# Patient Record
Sex: FEMALE | Race: WHITE | NOT HISPANIC OR LATINO | Employment: UNEMPLOYED | URBAN - METROPOLITAN AREA
[De-identification: names, ages, dates, MRNs, and addresses within clinical notes are randomized per-mention and may not be internally consistent; named-entity substitution may affect disease eponyms.]

---

## 2018-01-17 ENCOUNTER — OFFICE VISIT (OUTPATIENT)
Dept: URGENT CARE | Facility: CLINIC | Age: 53
End: 2018-01-17
Payer: COMMERCIAL

## 2018-01-17 VITALS
WEIGHT: 204 LBS | RESPIRATION RATE: 16 BRPM | TEMPERATURE: 97 F | OXYGEN SATURATION: 97 % | HEIGHT: 65 IN | DIASTOLIC BLOOD PRESSURE: 80 MMHG | HEART RATE: 82 BPM | SYSTOLIC BLOOD PRESSURE: 110 MMHG | BODY MASS INDEX: 33.99 KG/M2

## 2018-01-17 DIAGNOSIS — S82.451A CLOSED DISPLACED COMMINUTED FRACTURE OF SHAFT OF RIGHT FIBULA, INITIAL ENCOUNTER: Primary | ICD-10-CM

## 2018-01-17 PROCEDURE — 99203 OFFICE O/P NEW LOW 30 MIN: CPT | Mod: S$GLB,,, | Performed by: FAMILY MEDICINE

## 2018-01-17 RX ORDER — FERROUS SULFATE, DRIED 160(50) MG
1 TABLET, EXTENDED RELEASE ORAL 2 TIMES DAILY WITH MEALS
COMMUNITY

## 2018-01-17 RX ORDER — ALENDRONATE SODIUM 70 MG/1
70 TABLET ORAL WEEKLY
Refills: 5 | COMMUNITY
Start: 2018-01-06

## 2018-01-17 RX ORDER — BUDESONIDE 0.5 MG/2ML
INHALANT ORAL
Refills: 3 | COMMUNITY
Start: 2018-01-03

## 2018-01-17 RX ORDER — SIMVASTATIN 40 MG/1
40 TABLET, FILM COATED ORAL DAILY
Refills: 6 | COMMUNITY
Start: 2017-12-27

## 2018-01-17 RX ORDER — MONTELUKAST SODIUM 10 MG/1
10 TABLET ORAL NIGHTLY
Refills: 4 | COMMUNITY
Start: 2017-12-27

## 2018-01-17 RX ORDER — HYDROCODONE BITARTRATE AND ACETAMINOPHEN 5; 325 MG/1; MG/1
1 TABLET ORAL EVERY 6 HOURS PRN
Qty: 10 TABLET | Refills: 0 | Status: SHIPPED | OUTPATIENT
Start: 2018-01-17

## 2018-01-17 RX ORDER — SERTRALINE HYDROCHLORIDE 50 MG/1
TABLET, FILM COATED ORAL
Refills: 1 | COMMUNITY
Start: 2017-12-21

## 2018-01-17 RX ORDER — PREGABALIN 100 MG/1
100 CAPSULE ORAL 3 TIMES DAILY
Refills: 4 | COMMUNITY
Start: 2017-12-20

## 2018-01-17 RX ORDER — LEVOTHYROXINE SODIUM 100 UG/1
100 TABLET ORAL DAILY
Refills: 6 | COMMUNITY
Start: 2017-12-27

## 2018-01-17 RX ORDER — CETIRIZINE HYDROCHLORIDE 1 MG/ML
SOLUTION ORAL DAILY
COMMUNITY

## 2018-01-17 RX ORDER — PSEUDOEPHEDRINE HCL 120 MG/1
120 TABLET, FILM COATED, EXTENDED RELEASE ORAL
COMMUNITY

## 2018-01-17 NOTE — PATIENT INSTRUCTIONS
Leg Fracture    You have a break (fracture) of the leg. A fracture is treated with a splint, cast, or special boot. It will usually take at about 8 to 12 weeks for the fracture to heal, but it can take several months in some cases. If you have a severe injury, you may need surgery to fix it.  Home care  Follow these guidelines when caring for yourself at home:  · You will be given a splint, cast, boot, or other device to keep the injured area from moving. Unless you were told otherwise, use crutches or a walker. Dont put weight on the injured leg until your healthcare provider says you can do so. (You can rent crutches and a walker at many pharmacies and surgical or orthopedic supply stores.)  · Keep your leg elevated to reduce pain and swelling. When sleeping, put a pillow under the injured leg. When sitting, support the injured leg so it is above your waist. This is very important during the first 2 days (48 hours).  · Put an ice pack on the injured area. Do this for 20 minutes every 1 to 2 hours the first day for pain relief. You can make an ice pack by wrapping a plastic bag of ice cubes in a thin towel. As the ice melts, be careful that the cast, splint, or boot doesnt get wet. You can put the ice pack directly over the splint or cast. Continue using the ice pack 3 to 4 times a day for the next 2 days. Then use the ice pack as needed to ease pain and swelling.  · Keep the cast, splint, or boot completely dry at all times. Bathe with your cast, splint, or boot out of the water. Protect it with a large plastic bag, rubber-banded at the top end. If a boot or fiberglass cast or splint gets wet, you can dry it with a hair dryer.  · You may use acetaminophen or ibuprofen to control pain, unless another pain medicine was prescribed. If you have chronic liver or kidney disease, talk with your healthcare provider before using these medicines. Also talk with your provider if youve had a stomach ulcer or  gastrointestinal bleeding.  · Dont put creams or objects under the cast if you have itching.  Follow-up care  Follow up with your healthcare provider, or as advised. This is to make sure the bone is healing the way it should. If a splint was put on, it may be converted to a cast at your next visit.  X-rays may be taken. You will be told of any new findings that may affect your care.  When to seek medical advice  Call your healthcare provider right away if any of these occur:  · The cast or splint cracks  · The plaster cast or splint becomes wet or soft  · The fiberglass cast or splint stays wet for more than 24 hours  · Bad odor from the cast or wound fluid stains the cast  · Tightness or pain under the cast or splint gets worse  · Toes become swollen, cold, blue, numb, or tingly  · You cant move your toes  · Skin around cast or splint becomes red  · Fever of 100.4ºF (38ºC) or higher, or as directed by your healthcare provider  Date Last Reviewed: 2/1/2017 © 2000-2017 [employer]. [address] [place] [id number]. All rights reserved. This information is not intended as a substitute for professional medical care. Always follow your healthcare professional's instructions.

## 2018-01-17 NOTE — PROGRESS NOTES
"Subjective:       Patient ID: Rosalia Levi is a 52 y.o. female.    Vitals:  height is 5' 5" (1.651 m) and weight is 92.5 kg (204 lb). Her temperature is 97.4 °F (36.3 °C). Her blood pressure is 110/80 and her pulse is 82. Her respiration is 16 and oxygen saturation is 97%.     Chief Complaint: Ankle Pain and Ankle Injury    Pt was walking across the street and walked over some ice. She slipped and felt her ankle twist and fell on the ground. R ankle. Pt fell in the middle of [address] and crawled to the side walk. Happened around 3 hours ago. Denies taking anything for the pain. Pt is from [place]. Pt here with her  for a conference. Pt cannot put weight on the foot. Cannot move the ankle.       Ankle Pain    The incident occurred 1 to 3 hours ago. The incident occurred in the street. The pain is present in the right ankle. The pain is at a severity of 9/10. Pertinent negatives include no numbness.   Ankle Injury    Pertinent negatives include no numbness.     Review of Systems   Constitution: Negative for weakness and malaise/fatigue.   HENT: Negative for nosebleeds.    Cardiovascular: Negative for chest pain and syncope.   Respiratory: Negative for shortness of breath.    Musculoskeletal: Negative for back pain, joint pain and neck pain.   Gastrointestinal: Negative for abdominal pain.   Genitourinary: Negative for hematuria.   Neurological: Negative for dizziness and numbness.       Objective:      Physical Exam   Constitutional: She is oriented to person, place, and time. She appears well-developed and well-nourished. She is cooperative.  Non-toxic appearance. She does not appear ill. No distress.   HENT:   Head: Normocephalic and atraumatic. Head is without abrasion, without contusion and without laceration.   Right Ear: Hearing, tympanic membrane and ear canal normal. No hemotympanum.   Left Ear: Hearing, tympanic membrane and ear canal normal. No hemotympanum.   Nose: No mucosal edema, " rhinorrhea or nasal deformity. No epistaxis. Right sinus exhibits no maxillary sinus tenderness and no frontal sinus tenderness. Left sinus exhibits no maxillary sinus tenderness and no frontal sinus tenderness.   Mouth/Throat: Uvula is midline and mucous membranes are normal. No trismus in the jaw. Normal dentition. No uvula swelling. No posterior oropharyngeal erythema.   Eyes: Lids are normal. Right eye exhibits no discharge. Left eye exhibits no discharge. No scleral icterus.   Sclera clear bilat   Neck: Trachea normal, full passive range of motion without pain and phonation normal. No spinous process tenderness and no muscular tenderness present. No neck rigidity. No tracheal deviation present.   Cardiovascular: Normal pulses.    Abdominal: Normal appearance. She exhibits no pulsatile midline mass.   Musculoskeletal: She exhibits tenderness. She exhibits no edema or deformity.        Right ankle: She exhibits decreased range of motion and swelling. She exhibits no deformity, no laceration and normal pulse. Tenderness. Lateral malleolus tenderness found.        Feet:    Neurological: She is alert and oriented to person, place, and time. She has normal strength. No cranial nerve deficit or sensory deficit. She exhibits normal muscle tone. She displays no seizure activity. Coordination normal. GCS eye subscore is 4. GCS verbal subscore is 5. GCS motor subscore is 6.   Skin: Skin is warm, dry and intact. Capillary refill takes less than 2 seconds. No abrasion, no bruising, no burn, no ecchymosis and no laceration noted. She is not diaphoretic. No pallor.   Psychiatric: She has a normal mood and affect. Her speech is normal and behavior is normal. Judgment and thought content normal. Cognition and memory are normal.   Nursing note and vitals reviewed.      Assessment:       1. Closed displaced comminuted fracture of shaft of right fibula, initial encounter        Plan:         Closed displaced comminuted fracture of  shaft of right fibula, initial encounter  -     X-Ray Ankle Complete Right  -     CRUTCHES FOR HOME USE  -     AIR CAST WALKER BOOT FOR HOME USE  -     hydrocodone-acetaminophen 5-325mg (NORCO) 5-325 mg per tablet; Take 1 tablet by mouth every 6 (six) hours as needed for Pain. The medication you have been prescribed may cause drowsiness and impair your judgement.  Therefore, you should avoid driving, climbing, using machinery, etc., so as not to increase your risk of injury.  Do NOT drink any alcohol while on this medication(s). It is also addictive.  Dispense: 10 tablet; Refill: 0

## 2018-08-01 ENCOUNTER — OFFICE VISIT (OUTPATIENT)
Dept: ENDOCRINOLOGY | Facility: HOSPITAL | Age: 53
End: 2018-08-01
Payer: COMMERCIAL

## 2018-08-01 VITALS
HEIGHT: 65 IN | HEART RATE: 92 BPM | BODY MASS INDEX: 36.22 KG/M2 | WEIGHT: 217.4 LBS | DIASTOLIC BLOOD PRESSURE: 74 MMHG | SYSTOLIC BLOOD PRESSURE: 118 MMHG

## 2018-08-01 DIAGNOSIS — E06.3 HYPOTHYROIDISM DUE TO HASHIMOTO'S THYROIDITIS: ICD-10-CM

## 2018-08-01 DIAGNOSIS — R63.5 WEIGHT GAIN: Primary | ICD-10-CM

## 2018-08-01 DIAGNOSIS — E03.8 HYPOTHYROIDISM DUE TO HASHIMOTO'S THYROIDITIS: ICD-10-CM

## 2018-08-01 DIAGNOSIS — L65.9 HAIR LOSS: ICD-10-CM

## 2018-08-01 PROBLEM — E03.9 HYPOTHYROIDISM: Status: ACTIVE | Noted: 2018-08-01

## 2018-08-01 PROCEDURE — 99203 OFFICE O/P NEW LOW 30 MIN: CPT | Performed by: INTERNAL MEDICINE

## 2018-08-01 RX ORDER — TRIAMCINOLONE ACETONIDE 55 UG/1
SPRAY, METERED NASAL
COMMUNITY

## 2018-08-01 RX ORDER — BUDESONIDE 0.5 MG/2ML
INHALANT ORAL
Refills: 3 | COMMUNITY
Start: 2018-07-10

## 2018-08-01 RX ORDER — SERTRALINE HYDROCHLORIDE 100 MG/1
100 TABLET, FILM COATED ORAL DAILY
Refills: 4 | COMMUNITY
Start: 2018-07-10

## 2018-08-01 RX ORDER — SIMVASTATIN 40 MG
40 TABLET ORAL DAILY
Refills: 3 | COMMUNITY
Start: 2018-07-24

## 2018-08-01 RX ORDER — PREGABALIN 100 MG/1
100 CAPSULE ORAL 3 TIMES DAILY
COMMUNITY
Start: 2017-12-20

## 2018-08-01 RX ORDER — PSEUDOEPHEDRINE HCL 120 MG/1
120 TABLET, FILM COATED, EXTENDED RELEASE ORAL AS NEEDED
COMMUNITY

## 2018-08-01 RX ORDER — HYDROCODONE BITARTRATE AND ACETAMINOPHEN 5; 325 MG/1; MG/1
1 TABLET ORAL EVERY 6 HOURS PRN
COMMUNITY
Start: 2018-01-17

## 2018-08-01 RX ORDER — CALCIUM CARBONATE-CHOLECALCIFEROL TAB 250 MG-125 UNIT 250-125 MG-UNIT
1 TAB ORAL
COMMUNITY

## 2018-08-01 RX ORDER — LEVOTHYROXINE SODIUM 0.1 MG/1
100 TABLET ORAL DAILY
Refills: 6 | COMMUNITY
Start: 2018-07-23 | End: 2018-08-18 | Stop reason: SDUPTHER

## 2018-08-01 RX ORDER — CETIRIZINE HYDROCHLORIDE 10 MG/1
TABLET, CHEWABLE ORAL DAILY
COMMUNITY

## 2018-08-01 RX ORDER — MONTELUKAST SODIUM 10 MG/1
10 TABLET ORAL
COMMUNITY
Start: 2017-12-27

## 2018-08-01 NOTE — PATIENT INSTRUCTIONS
Most recent thyroid blood work in May was normal   Let's check blood work now and do it fasting  Call within 1 week of the blood work for results  Follow up based on blood work

## 2018-08-01 NOTE — PROGRESS NOTES
8/1/2018    Assessment/Plan      Diagnoses and all orders for this visit:    Weight gain  -     Comprehensive metabolic panel Lab Collect; Future  -     TSH, 3rd generation Lab Collect; Future  -     T4, free Lab Collect; Future  -     Testosterone, free, total Lab Collect; Future  -     T3, free; Future  -     Insulin, fasting- Lab Collect; Future  -     Comprehensive metabolic panel Lab Collect  -     TSH, 3rd generation Lab Collect  -     T4, free Lab Collect  -     Testosterone, free, total Lab Collect  -     T3, free    Hair loss  -     Comprehensive metabolic panel Lab Collect; Future  -     TSH, 3rd generation Lab Collect; Future  -     T4, free Lab Collect; Future  -     Testosterone, free, total Lab Collect; Future  -     T3, free; Future  -     Insulin, fasting- Lab Collect; Future  -     Comprehensive metabolic panel Lab Collect  -     TSH, 3rd generation Lab Collect  -     T4, free Lab Collect  -     Testosterone, free, total Lab Collect  -     T3, free    Hypothyroidism due to Hashimoto's thyroiditis  -     Comprehensive metabolic panel Lab Collect; Future  -     TSH, 3rd generation Lab Collect; Future  -     T4, free Lab Collect; Future  -     Testosterone, free, total Lab Collect; Future  -     T3, free; Future  -     Insulin, fasting- Lab Collect; Future  -     Comprehensive metabolic panel Lab Collect  -     TSH, 3rd generation Lab Collect  -     T4, free Lab Collect  -     Testosterone, free, total Lab Collect  -     T3, free    Other orders  -     budesonide (PULMICORT) 0 5 mg/2 mL nebulizer solution; TAKE 1 VIAL (2 ML) USING NEBULIZER EVERY 12 HOURS  -     Calcium Carb-Cholecalciferol (CALCIUM-VITAMIN D3) 250-125 MG-UNIT TABS; Take 1 tablet by mouth  -     cetirizine (ZyrTEC) 10 MG chewable tablet; Chew daily  -     HYDROcodone-acetaminophen (NORCO) 5-325 mg per tablet; Take 1 tablet by mouth every 6 (six) hours as needed  -     levothyroxine 100 mcg tablet;  Take 100 mcg by mouth daily  - pregabalin (LYRICA) 100 mg capsule; Take 100 mg by mouth Three times a day  -     montelukast (SINGULAIR) 10 mg tablet; Take 10 mg by mouth  -     Multiple Vitamins-Minerals (MULTIVITAMIN ADULT EXTRA C PO); Take 1 capsule by mouth daily  -     pseudoephedrine (SUDAFED) 120 MG 12 hr tablet; Take 120 mg by mouth  -     sertraline (ZOLOFT) 50 mg tablet; TAKE 1 TABLET DAILY WITH 100MG TABLET  -     sertraline (ZOLOFT) 100 mg tablet; Take 100 mg by mouth daily  -     simvastatin (ZOCOR) 40 mg tablet; Take 40 mg by mouth daily  -     Triamcinolone Acetonide 55 MCG/ACT AERO; into each nostril        Assessment/Plan:  1  Hypothyroidism  Most recent thyroid function tests in May are normal  It has been several months since she is continuing to feel worse, so I will repeat her TSH with free T4 and free T3 now  She will continue the same levothyroxine 100 mcg per day for now  2   Given her hair loss and weight gain, I will check a fasting CMP with insulin level along with free and total testosterone levels  She has a family history of diabetes in could have some element of PCOS/metabolic syndrome  She will call week after the studies for results and we will determine follow-up based on that  CC: thyroid consult    History of Present Illness     HPI: Elvira Dwyer is a 47y o  year old female with history of hypothyroidism  She has been on thyroid hormone for several years  She takes levothyroxine 100 mcg daily  No recent dosage change  She she has no acute heat or cold intolerance but can feel hot at times and limbs can feel cold  She does get palpitations when she is more anxious and her anxiety has been a bit worse  She denies tremors  She has alternating diarrhea and constipation  She says sleeping can be variable and she is exhausted all the time  She can actually sleep up to 12 hours at a time in still be exhausted  She says she has been gaining weight at least 10 lb since the beginning of the year    She has dry skin, dry nails, and dry hair  She says she is losing more hair in its thinning visibly on the top of her head  She fractured her right lower leg in January 2018 in [place]  She had been on Fosamax on and off for many years and stopped it in January 2018  She noticed it took several months to heal and still hurts her at times  Review of Systems   Constitutional: Positive for fatigue and unexpected weight change  Went increased 15 lbs very quickly at the end of 2017 to 2018  Extremely fatigued and can sleep up to 12 hours at time  HENT: Negative for hearing loss and tinnitus  Eyes: Negative for visual disturbance  No diplopia  Wears glasses  Respiratory: Positive for shortness of breath  Negative for chest tightness  Some increase SOB with weight gain  Cardiovascular: Positive for palpitations  Negative for chest pain and leg swelling  Some palpitations with increase in anxiety  Gastrointestinal: Positive for constipation and diarrhea  Negative for abdominal pain and nausea  Endocrine: Negative for cold intolerance and heat intolerance  Can be hot but feet and arms get cold  Musculoskeletal: Positive for arthralgias and neck pain  Negative for back pain  She hurts all over in general  Wrists, hips, knees, neck pain  Skin: Negative for rash  Has dry skin, nails, and hair  Losing hair again  She feels her hair is thinning on top  Neurological: Positive for dizziness  Negative for tremors, light-headedness, numbness and headaches  Will get dizzy spells  Psychiatric/Behavioral: Positive for sleep disturbance  Negative for dysphoric mood  The patient is nervous/anxious  Sleeping is variable         Historical Information   Past Medical History:   Diagnosis Date    Anxiety     Depression     Fibromyalgia     Fracture of right lower leg 01/2018    GERD (gastroesophageal reflux disease)     Hyperlipidemia     Myofascial pain dysfunction syndrome     Osteoporosis     Seasonal and perennial allergic rhinitis      Past Surgical History:   Procedure Laterality Date    ANKLE SURGERY Right     KNEE ARTHROTOMY Left     X 3    SINUS SURGERY      X 4    WISDOM TOOTH EXTRACTION       Social History   History   Alcohol Use No     History   Drug Use No     History   Smoking Status    Never Smoker   Smokeless Tobacco    Never Used     Family History:   Family History   Problem Relation Age of Onset   Geri Silence Breast cancer Mother     Thyroid disease unspecified Mother         post partial thyroidectomy    Diabetes unspecified Father     Hypertension Father     Heart attack Father     Heart disease Father     Breast cancer Maternal Grandmother     Diabetes unspecified Maternal Grandfather     Diabetes unspecified Paternal Grandfather     Heart Valve Disease Brother         Has mitral valve disease and had endocarditis       Meds/Allergies   Current Outpatient Prescriptions   Medication Sig Dispense Refill    budesonide (PULMICORT) 0 5 mg/2 mL nebulizer solution TAKE 1 VIAL (2 ML) USING NEBULIZER EVERY 12 HOURS   3    Calcium Carb-Cholecalciferol (CALCIUM-VITAMIN D3) 250-125 MG-UNIT TABS Take 1 tablet by mouth      cetirizine (ZyrTEC) 10 MG chewable tablet Chew daily      HYDROcodone-acetaminophen (NORCO) 5-325 mg per tablet Take 1 tablet by mouth every 6 (six) hours as needed      levothyroxine 100 mcg tablet Take 100 mcg by mouth daily  6    montelukast (SINGULAIR) 10 mg tablet Take 10 mg by mouth      Multiple Vitamins-Minerals (MULTIVITAMIN ADULT EXTRA C PO) Take 1 capsule by mouth daily      pregabalin (LYRICA) 100 mg capsule Take 100 mg by mouth Three times a day      pseudoephedrine (SUDAFED) 120 MG 12 hr tablet Take 120 mg by mouth      sertraline (ZOLOFT) 100 mg tablet Take 100 mg by mouth daily  4    sertraline (ZOLOFT) 50 mg tablet TAKE 1 TABLET DAILY WITH 100MG TABLET  5    simvastatin (ZOCOR) 40 mg tablet Take 40 mg by mouth daily  3    Triamcinolone Acetonide 55 MCG/ACT AERO into each nostril       No current facility-administered medications for this visit  Allergies   Allergen Reactions    Blueberry Flavor Hives    Clindamycin Hives    Doxycycline Hives    Dust Mite Extract Sneezing    Milk Protein Hives    Orange Oil Hives    Shellfish Allergy Hives    Tomato Hives    Trichophyton Sneezing    Vancomycin      Other reaction(s): Throat Swelling     Wheat Bran Hives    Dicloxacillin Rash    Sulfa Antibiotics Rash       Objective   Vitals: Blood pressure 118/74, pulse 92, height 5' 5" (1 651 m), weight 98 6 kg (217 lb 6 4 oz)  Invasive Devices          No matching active lines, drains, or airways          Physical Exam   Constitutional: She is oriented to person, place, and time  She appears well-developed and well-nourished  HENT:   Head: Normocephalic and atraumatic  Eyes: Conjunctivae and EOM are normal  Pupils are equal, round, and reactive to light  No lid lag, stare, proptosis, or periorbital edema  Neck: Normal range of motion  Neck supple  No thyromegaly present  No thyroid nodules palpable  No bruits over the thyroid or carotids  Cardiovascular: Normal rate, regular rhythm, normal heart sounds and intact distal pulses  No murmur heard  Pulmonary/Chest: Effort normal and breath sounds normal  She has no wheezes  Abdominal: Soft  Bowel sounds are normal  There is no tenderness  Musculoskeletal: Normal range of motion  She exhibits no edema or deformity  No tremor of the outstretched hands  No spinous process tenderness  No CVA tenderness  Lymphadenopathy:     She has no cervical adenopathy  Neurological: She is alert and oriented to person, place, and time  She has normal reflexes  Skin: Skin is warm and dry  No rash noted  Vitals reviewed  The history was obtained from the review of the chart and from the patient      Lab Results:   Blood work done at [facility] on 05/08/2018 showed a nonfasting CMP with a glucose of 130 but was otherwise normal   TSH is 0 873 with a free T4 of 1 17  No results found for this or any previous visit (from the past 55769 hour(s))  No future appointments

## 2018-08-03 LAB
ALBUMIN SERPL-MCNC: 4.3 G/DL (ref 3.5–5.5)
ALBUMIN/GLOB SERPL: 1.9 {RATIO} (ref 1.2–2.2)
ALP SERPL-CCNC: 77 IU/L (ref 39–117)
ALT SERPL-CCNC: 33 IU/L (ref 0–32)
AST SERPL-CCNC: 26 IU/L (ref 0–40)
BILIRUB SERPL-MCNC: 0.6 MG/DL (ref 0–1.2)
BUN SERPL-MCNC: 16 MG/DL (ref 6–24)
BUN/CREAT SERPL: 18 (ref 9–23)
CALCIUM SERPL-MCNC: 9.5 MG/DL (ref 8.7–10.2)
CHLORIDE SERPL-SCNC: 105 MMOL/L (ref 96–106)
CO2 SERPL-SCNC: 24 MMOL/L (ref 20–29)
CREAT SERPL-MCNC: 0.91 MG/DL (ref 0.57–1)
GLOBULIN SER-MCNC: 2.3 G/DL (ref 1.5–4.5)
GLUCOSE SERPL-MCNC: 123 MG/DL (ref 65–99)
INSULIN SERPL-ACNC: 18.1 UIU/ML (ref 2.6–24.9)
POTASSIUM SERPL-SCNC: 4.5 MMOL/L (ref 3.5–5.2)
PROT SERPL-MCNC: 6.6 G/DL (ref 6–8.5)
SL AMB EGFR AFRICAN AMERICAN: 84 ML/MIN/1.73
SL AMB EGFR NON AFRICAN AMERICAN: 73 ML/MIN/1.73
SODIUM SERPL-SCNC: 143 MMOL/L (ref 134–144)
T3FREE SERPL-MCNC: 2.6 PG/ML (ref 2–4.4)
T4 FREE SERPL-MCNC: 1.14 NG/DL (ref 0.82–1.77)
TESTOST FREE SERPL-MCNC: 2.3 PG/ML (ref 0–4.2)
TESTOST SERPL-MCNC: 3 NG/DL (ref 3–41)
TSH SERPL DL<=0.005 MIU/L-ACNC: 1.18 UIU/ML (ref 0.45–4.5)

## 2018-08-18 DIAGNOSIS — E03.9 HYPOTHYROIDISM, UNSPECIFIED TYPE: Primary | ICD-10-CM

## 2018-08-20 RX ORDER — LEVOTHYROXINE SODIUM 0.1 MG/1
TABLET ORAL
Qty: 30 TABLET | Refills: 6 | Status: SHIPPED | OUTPATIENT
Start: 2018-08-20 | End: 2019-03-13 | Stop reason: SDUPTHER

## 2019-03-13 DIAGNOSIS — E03.9 HYPOTHYROIDISM, UNSPECIFIED TYPE: ICD-10-CM

## 2019-03-13 RX ORDER — LEVOTHYROXINE SODIUM 0.1 MG/1
TABLET ORAL
Qty: 30 TABLET | Refills: 6 | Status: SHIPPED | OUTPATIENT
Start: 2019-03-13 | End: 2019-10-23 | Stop reason: SDUPTHER

## 2019-10-17 DIAGNOSIS — E03.9 HYPOTHYROIDISM, UNSPECIFIED TYPE: ICD-10-CM

## 2019-10-17 RX ORDER — LEVOTHYROXINE SODIUM 0.1 MG/1
TABLET ORAL
Qty: 30 TABLET | Refills: 6 | OUTPATIENT
Start: 2019-10-17

## 2019-10-23 ENCOUNTER — TELEPHONE (OUTPATIENT)
Dept: ENDOCRINOLOGY | Facility: HOSPITAL | Age: 54
End: 2019-10-23

## 2019-10-23 DIAGNOSIS — E06.3 HYPOTHYROIDISM DUE TO HASHIMOTO'S THYROIDITIS: Primary | ICD-10-CM

## 2019-10-23 DIAGNOSIS — E03.9 HYPOTHYROIDISM, UNSPECIFIED TYPE: ICD-10-CM

## 2019-10-23 DIAGNOSIS — E03.8 HYPOTHYROIDISM DUE TO HASHIMOTO'S THYROIDITIS: Primary | ICD-10-CM

## 2019-10-23 RX ORDER — LEVOTHYROXINE SODIUM 0.1 MG/1
100 TABLET ORAL DAILY
Qty: 30 TABLET | Refills: 6 | Status: SHIPPED | OUTPATIENT
Start: 2019-10-23 | End: 2019-11-22 | Stop reason: SDUPTHER

## 2019-10-23 NOTE — TELEPHONE ENCOUNTER
Patient called  Pharmacy would not refill her levothyroxine  Patient was last seen 8-1-18, but no follow up was ever done (see note on 8-2-18 lab results)  She scheduled for 1-2-10  Could you please resubmit the levothyroxine prescription and order whatever labs you want done prior to the January appt? No need to call back unless there's a problem  Mail lab slip to patient

## 2019-11-21 LAB — HBA1C MFR BLD HPLC: 6.8 %

## 2019-11-22 ENCOUNTER — TELEPHONE (OUTPATIENT)
Dept: ENDOCRINOLOGY | Facility: HOSPITAL | Age: 54
End: 2019-11-22

## 2019-11-22 DIAGNOSIS — E03.9 HYPOTHYROIDISM, UNSPECIFIED TYPE: ICD-10-CM

## 2019-11-22 RX ORDER — LEVOTHYROXINE SODIUM 0.1 MG/1
TABLET ORAL
Qty: 30 TABLET | Refills: 6
Start: 2019-11-22 | End: 2020-01-02 | Stop reason: SDUPTHER

## 2019-11-22 NOTE — TELEPHONE ENCOUNTER
Karlene Sinha from Dr Kory Long office called  They just had thyroid blood work done & results were abnormal   They want to know what you want to do  Results are in your in bin  Need to call her back with instructions ([phone])

## 2019-11-22 NOTE — TELEPHONE ENCOUNTER
We can make those changes  We would need to contact the patient and let her know  I adjusted her medication in her chart in ordered labs for 6 weeks from now

## 2019-11-22 NOTE — TELEPHONE ENCOUNTER
Reviewed labs  This would suggest her dose is too high  Would suggest reducing her dose of levothyroxine 100 mcg tablets to 1 tablet 6 days a week but only half tablet on Sundays  We will need to repeat TSH and free T4 in about 6 weeks  Labs from [address] [place] [address] on 11/21/2019 showed a TSH of 0 195 an A1c of 6 8

## 2019-12-27 LAB
ALBUMIN SERPL-MCNC: 4.6 G/DL (ref 3.5–5.5)
ALBUMIN/GLOB SERPL: 2 {RATIO} (ref 1.2–2.2)
ALP SERPL-CCNC: 97 IU/L (ref 39–117)
ALT SERPL-CCNC: 32 IU/L (ref 0–32)
AST SERPL-CCNC: 25 IU/L (ref 0–40)
BASOPHILS # BLD AUTO: 0 X10E3/UL (ref 0–0.2)
BASOPHILS NFR BLD AUTO: 1 %
BILIRUB SERPL-MCNC: 0.8 MG/DL (ref 0–1.2)
BUN SERPL-MCNC: 19 MG/DL (ref 6–24)
BUN/CREAT SERPL: 19 (ref 9–23)
CALCIUM SERPL-MCNC: 9.9 MG/DL (ref 8.7–10.2)
CHLORIDE SERPL-SCNC: 102 MMOL/L (ref 96–106)
CO2 SERPL-SCNC: 24 MMOL/L (ref 20–29)
CREAT SERPL-MCNC: 0.98 MG/DL (ref 0.57–1)
EOSINOPHIL # BLD AUTO: 0.3 X10E3/UL (ref 0–0.4)
EOSINOPHIL NFR BLD AUTO: 4 %
ERYTHROCYTE [DISTWIDTH] IN BLOOD BY AUTOMATED COUNT: 14.1 % (ref 12.3–15.4)
GLOBULIN SER-MCNC: 2.3 G/DL (ref 1.5–4.5)
GLUCOSE SERPL-MCNC: 138 MG/DL (ref 65–99)
HCT VFR BLD AUTO: 41.8 % (ref 34–46.6)
HGB BLD-MCNC: 13.4 G/DL (ref 11.1–15.9)
IMM GRANULOCYTES # BLD: 0 X10E3/UL (ref 0–0.1)
IMM GRANULOCYTES NFR BLD: 0 %
LYMPHOCYTES # BLD AUTO: 2.9 X10E3/UL (ref 0.7–3.1)
LYMPHOCYTES NFR BLD AUTO: 44 %
MCH RBC QN AUTO: 30.8 PG (ref 26.6–33)
MCHC RBC AUTO-ENTMCNC: 32.1 G/DL (ref 31.5–35.7)
MCV RBC AUTO: 96 FL (ref 79–97)
MONOCYTES # BLD AUTO: 0.4 X10E3/UL (ref 0.1–0.9)
MONOCYTES NFR BLD AUTO: 7 %
NEUTROPHILS # BLD AUTO: 2.8 X10E3/UL (ref 1.4–7)
NEUTROPHILS NFR BLD AUTO: 44 %
PLATELET # BLD AUTO: 302 X10E3/UL (ref 150–450)
POTASSIUM SERPL-SCNC: 4.5 MMOL/L (ref 3.5–5.2)
PROT SERPL-MCNC: 6.9 G/DL (ref 6–8.5)
RBC # BLD AUTO: 4.35 X10E6/UL (ref 3.77–5.28)
SL AMB EGFR AFRICAN AMERICAN: 76 ML/MIN/1.73
SL AMB EGFR NON AFRICAN AMERICAN: 66 ML/MIN/1.73
SODIUM SERPL-SCNC: 143 MMOL/L (ref 134–144)
T4 FREE SERPL-MCNC: 1.24 NG/DL (ref 0.82–1.77)
TSH SERPL DL<=0.005 MIU/L-ACNC: 4.7 UIU/ML (ref 0.45–4.5)
WBC # BLD AUTO: 6.5 X10E3/UL (ref 3.4–10.8)

## 2020-01-02 ENCOUNTER — OFFICE VISIT (OUTPATIENT)
Dept: ENDOCRINOLOGY | Facility: HOSPITAL | Age: 55
End: 2020-01-02
Payer: COMMERCIAL

## 2020-01-02 VITALS
WEIGHT: 211.6 LBS | BODY MASS INDEX: 35.25 KG/M2 | HEART RATE: 88 BPM | DIASTOLIC BLOOD PRESSURE: 74 MMHG | HEIGHT: 65 IN | SYSTOLIC BLOOD PRESSURE: 112 MMHG

## 2020-01-02 DIAGNOSIS — L65.9 HAIR LOSS: ICD-10-CM

## 2020-01-02 DIAGNOSIS — E06.3 HYPOTHYROIDISM DUE TO HASHIMOTO'S THYROIDITIS: Primary | ICD-10-CM

## 2020-01-02 DIAGNOSIS — E03.9 HYPOTHYROIDISM, UNSPECIFIED TYPE: ICD-10-CM

## 2020-01-02 DIAGNOSIS — E03.8 HYPOTHYROIDISM DUE TO HASHIMOTO'S THYROIDITIS: Primary | ICD-10-CM

## 2020-01-02 DIAGNOSIS — R73.9 HYPERGLYCEMIA: ICD-10-CM

## 2020-01-02 PROBLEM — R63.5 WEIGHT GAIN: Status: RESOLVED | Noted: 2018-08-01 | Resolved: 2020-01-02

## 2020-01-02 PROCEDURE — 99214 OFFICE O/P EST MOD 30 MIN: CPT | Performed by: INTERNAL MEDICINE

## 2020-01-02 RX ORDER — LEVOTHYROXINE SODIUM 0.1 MG/1
TABLET ORAL
Qty: 30 TABLET | Refills: 6
Start: 2020-01-02 | End: 2020-05-18

## 2020-01-02 NOTE — PATIENT INSTRUCTIONS
The thyroid blood work is underactive now  Increase the levothyroxine 100 mcg 1 whole tablet daily  We'll recheck the blood work in 3 months  Follow up in 1 year with blood work

## 2020-01-02 NOTE — PROGRESS NOTES
1/3/2020    Assessment/Plan      Diagnoses and all orders for this visit:    Hypothyroidism due to Hashimoto's thyroiditis  -     Cancel: TSH, 3rd generation Lab Collect; Future  -     Cancel: T4, free Lab Collect; Future  -     Cancel: TSH, 3rd generation Lab Collect  -     Cancel: T4, free Lab Collect  -     Comprehensive metabolic panel Lab Collect; Future  -     CBC and differential Lab Collect; Future  -     Testosterone, free, total Lab Collect; Future  -     TSH, 3rd generation Lab Collect; Future  -     T4, free Lab Collect; Future  -     HEMOGLOBIN A1C W/ EAG ESTIMATION Lab Collect; Future  -     Comprehensive metabolic panel Lab Collect  -     CBC and differential Lab Collect  -     Testosterone, free, total Lab Collect  -     TSH, 3rd generation Lab Collect  -     T4, free Lab Collect  -     HEMOGLOBIN A1C W/ EAG ESTIMATION Lab Collect    Hair loss  -     Cancel: TSH, 3rd generation Lab Collect; Future  -     Cancel: T4, free Lab Collect; Future  -     Cancel: TSH, 3rd generation Lab Collect  -     Cancel: T4, free Lab Collect  -     Comprehensive metabolic panel Lab Collect; Future  -     CBC and differential Lab Collect; Future  -     Testosterone, free, total Lab Collect; Future  -     TSH, 3rd generation Lab Collect; Future  -     T4, free Lab Collect; Future  -     HEMOGLOBIN A1C W/ EAG ESTIMATION Lab Collect; Future  -     Comprehensive metabolic panel Lab Collect  -     CBC and differential Lab Collect  -     Testosterone, free, total Lab Collect  -     TSH, 3rd generation Lab Collect  -     T4, free Lab Collect  -     HEMOGLOBIN A1C W/ EAG ESTIMATION Lab Collect    Hyperglycemia  -     Comprehensive metabolic panel Lab Collect; Future  -     CBC and differential Lab Collect; Future  -     Testosterone, free, total Lab Collect; Future  -     TSH, 3rd generation Lab Collect; Future  -     T4, free Lab Collect; Future  -     HEMOGLOBIN A1C W/ EAG ESTIMATION Lab Collect;  Future  -     Comprehensive metabolic panel Lab Collect  -     CBC and differential Lab Collect  -     Testosterone, free, total Lab Collect  -     TSH, 3rd generation Lab Collect  -     T4, free Lab Collect  -     HEMOGLOBIN A1C W/ EAG ESTIMATION Lab Collect    Hypothyroidism, unspecified type  -     levothyroxine 100 mcg tablet; 1 tab daily        Assessment/Plan:  1  Hypothyroidism due to Hashimoto's thyroiditis  Most recent thyroid function tests do show an elevation in TSH  This signifies biochemical hypothyroidism and under replacement with thyroid hormone  I have asked her to increase her levothyroxine to 100 mcg daily  I will then repeat a TSH and free T4 in 3 months  Further adjustments in her thyroid hormone will be based according to this blood work  2  Hyperglycemia  She had a recent hemoglobin A1c of 6 9%  Glucose in December was 138 random  I will follow her glucose over time and repeat her hemoglobin A1c and fasting glucose next year  I counseled her regarding carbohydrate restriction to keep the blood sugar under control  3  Hair loss  Blood work done last year for her hair loss showed no abnormalities that would have caused this  I will follow her CBC and testosterone levels over time  Thyroid function tests are a bit hypothyroid and may be contributing some to her hair loss at this point  CC:  Hypothyroid follow-up    History of Present Illness     HPI: Eb Spence is a 49y o  year old female with history of hypothyroidism here for follow-up  She has been on thyroid hormone for 4-5 years and takes levothyroxine 100 mcg 1 tablet 6 days a week and 1/2 tablet on sunday  TSH was low in November 2019 and dose of levothyroxine decreased  She has been feeling more fatigued  Of note, she has been eating better as her  was diagnosed with diabetes and has lost 6 lb  She has alternating diarrhea and constipation unchanged  She has occasional hot flashes but will feel cold arms and feet    She still has dry skin, brittle nails, and hair loss  She has a lot of difficulty falling asleep and has been struggling with her anxiety and depression  She denies actual heat or cold intolerance, palpitation, or tremors  She has no diplopia  She has no compressive thyroid symptoms or difficulties with swallowing  She had weight gain and hair loss in the past and workup was negative  She had a TKR in December 2019  Had another fall sept 2019 and knee injured with bilateral ankle sprains  She had been on crutches since September  Review of Systems   Constitutional: Positive for fatigue and unexpected weight change  Weight 6 lb less than August 2018  She is eating differently as  diagnosed with diabetes  HENT: Negative for trouble swallowing  Eyes: Negative for visual disturbance  Wears glasses  No diplopia  Respiratory: Positive for shortness of breath  Negative for chest tightness  SOB with out of shape  Cardiovascular: Negative for chest pain and palpitations  Gastrointestinal: Positive for constipation and diarrhea  Negative for abdominal pain and nausea  Alternating diarrhea and constipation unchanged  Endocrine: Negative for cold intolerance and heat intolerance  Will get occasional hot flashes, but arms and feet very cold  Genitourinary:        Postmenopausal    Musculoskeletal:        Using a cane to walk  Skin: Negative for rash  Has dry skin  Has brittle nails  Has dry hair  Still losing hair a lot  Neurological: Negative for dizziness, tremors, light-headedness and headaches  Psychiatric/Behavioral: Positive for dysphoric mood and sleep disturbance  The patient is nervous/anxious  Some difficulty falling asleep  Still struggling with anxiety and depression         Historical Information   Past Medical History:   Diagnosis Date    Anxiety     Depression     Fibromyalgia     Fracture of right lower leg 01/2018    GERD (gastroesophageal reflux disease)     Hyperlipidemia     Myofascial pain dysfunction syndrome     Osteoporosis     Seasonal and perennial allergic rhinitis      Past Surgical History:   Procedure Laterality Date    ANKLE SURGERY Right     KNEE ARTHROTOMY Left     X 3    SINUS SURGERY      X 4    TOTAL KNEE ARTHROPLASTY Left 12/2019    WISDOM TOOTH EXTRACTION       Social History   Social History     Substance and Sexual Activity   Alcohol Use No     Social History     Substance and Sexual Activity   Drug Use No     Social History     Tobacco Use   Smoking Status Never Smoker   Smokeless Tobacco Never Used     Family History:   Family History   Problem Relation Age of Onset    Breast cancer Mother     Thyroid disease unspecified Mother         post partial thyroidectomy    Diabetes unspecified Father     Hypertension Father     Heart attack Father     Heart disease Father     Breast cancer Maternal Grandmother     Diabetes unspecified Maternal Grandfather     Diabetes unspecified Paternal Grandfather     Heart Valve Disease Brother         Has mitral valve disease and had endocarditis       Meds/Allergies   Current Outpatient Medications   Medication Sig Dispense Refill    budesonide (PULMICORT) 0 5 mg/2 mL nebulizer solution TAKE 1 VIAL (2 ML) USING NEBULIZER EVERY 12 HOURS   3    Calcium Carb-Cholecalciferol (CALCIUM-VITAMIN D3) 250-125 MG-UNIT TABS Take 1 tablet by mouth      cetirizine (ZyrTEC) 10 MG chewable tablet Chew daily      HYDROcodone-acetaminophen (NORCO) 5-325 mg per tablet Take 1 tablet by mouth every 6 (six) hours as needed      levothyroxine 100 mcg tablet 1 tab daily 30 tablet 6    montelukast (SINGULAIR) 10 mg tablet Take 10 mg by mouth      Multiple Vitamins-Minerals (MULTIVITAMIN ADULT EXTRA C PO) Take 1 capsule by mouth daily      pregabalin (LYRICA) 100 mg capsule Take 100 mg by mouth Three times a day      pseudoephedrine (SUDAFED) 120 MG 12 hr tablet Take 120 mg by mouth      sertraline (ZOLOFT) 100 mg tablet Take 100 mg by mouth daily  4    sertraline (ZOLOFT) 50 mg tablet TAKE 1 TABLET DAILY WITH 100MG TABLET  5    simvastatin (ZOCOR) 40 mg tablet Take 40 mg by mouth daily  3    Triamcinolone Acetonide 55 MCG/ACT AERO into each nostril       No current facility-administered medications for this visit  Allergies   Allergen Reactions    Blueberry Flavor Hives    Clindamycin Hives    Doxycycline Hives    Dust Mite Extract Sneezing    Milk Protein Hives    Orange Oil Hives    Shellfish Allergy Hives    Tomato Hives    Trichophyton Sneezing    Vancomycin      Other reaction(s): Throat Swelling     Wheat Bran Hives    Dicloxacillin Rash    Sulfa Antibiotics Rash       Objective   Vitals: Blood pressure 112/74, pulse 88, height 5' 5" (1 651 m), weight 96 kg (211 lb 9 6 oz)  Invasive Devices     None                 Physical Exam   Constitutional: She is oriented to person, place, and time  She appears well-developed and well-nourished  HENT:   Head: Normocephalic and atraumatic  Eyes: Pupils are equal, round, and reactive to light  Conjunctivae and EOM are normal    No lid lag, stare, proptosis, or periorbital edema  Neck: Normal range of motion  Neck supple  No thyromegaly present  Thyroid normal in size without palpable thyroid nodules  No bruits over the thyroid gland or carotids  Cardiovascular: Normal rate, regular rhythm and normal heart sounds  No murmur heard  Pulmonary/Chest: Effort normal and breath sounds normal  She has no wheezes  Musculoskeletal: Normal range of motion  She exhibits no edema or deformity  No tremor of the outstretched hands  Lymphadenopathy:     She has no cervical adenopathy  Neurological: She is alert and oriented to person, place, and time  She has normal reflexes  Patellar deep tendon reflex on the right normal   Left patellar reflex was not evaluated due to her recent knee surgery     Skin: Skin is warm and dry  No rash noted  Vitals reviewed  The history was obtained from the review of the chart and from the patient  Lab Results:    Blood work done on 12/26/2019 nonfasting showed a glucose of 138 but was otherwise normal     Lab Results   Component Value Date    CREATININE 0 98 12/26/2019    CREATININE 0 91 08/02/2018    BUN 19 12/26/2019    K 4 5 12/26/2019     12/26/2019    CO2 24 12/26/2019       Lab Results   Component Value Date    ALT 32 12/26/2019    AST 25 12/26/2019       Lab Results   Component Value Date    TSH 4 700 (H) 12/26/2019    FREET4 1 24 12/26/2019     Of note, hemoglobin A1c done on 11/21/2019 showed a result of 6 9% done at [facility]      Future Appointments   Date Time Provider Flynn Padron   1/4/2021  2:20 PM Ananth Cedeno MD [facility]

## 2020-05-16 DIAGNOSIS — E03.9 HYPOTHYROIDISM, UNSPECIFIED TYPE: ICD-10-CM

## 2020-05-18 RX ORDER — LEVOTHYROXINE SODIUM 0.1 MG/1
TABLET ORAL
Qty: 30 TABLET | Refills: 6 | Status: SHIPPED | OUTPATIENT
Start: 2020-05-18 | End: 2020-12-14

## 2020-06-10 LAB
T4 FREE SERPL-MCNC: 1.57 NG/DL (ref 0.82–1.77)
TSH SERPL DL<=0.005 MIU/L-ACNC: 0.1 UIU/ML (ref 0.45–4.5)

## 2020-06-12 DIAGNOSIS — E06.3 HYPOTHYROIDISM DUE TO HASHIMOTO'S THYROIDITIS: Primary | ICD-10-CM

## 2020-06-12 DIAGNOSIS — E03.8 HYPOTHYROIDISM DUE TO HASHIMOTO'S THYROIDITIS: Primary | ICD-10-CM

## 2020-06-12 RX ORDER — LEVOTHYROXINE SODIUM 88 MCG
TABLET ORAL
Qty: 4 TABLET | Refills: 5 | Status: SHIPPED | OUTPATIENT
Start: 2020-06-12 | End: 2021-01-04 | Stop reason: SDUPTHER

## 2020-06-15 DIAGNOSIS — E06.3 HYPOTHYROIDISM DUE TO HASHIMOTO'S THYROIDITIS: Primary | ICD-10-CM

## 2020-06-15 DIAGNOSIS — E03.8 HYPOTHYROIDISM DUE TO HASHIMOTO'S THYROIDITIS: Primary | ICD-10-CM

## 2020-09-24 LAB
T4 FREE SERPL-MCNC: 1.44 NG/DL (ref 0.82–1.77)
TSH SERPL DL<=0.005 MIU/L-ACNC: 0.15 UIU/ML (ref 0.45–4.5)

## 2020-11-29 DIAGNOSIS — E03.8 HYPOTHYROIDISM DUE TO HASHIMOTO'S THYROIDITIS: ICD-10-CM

## 2020-11-29 DIAGNOSIS — E06.3 HYPOTHYROIDISM DUE TO HASHIMOTO'S THYROIDITIS: ICD-10-CM

## 2020-11-30 RX ORDER — LEVOTHYROXINE SODIUM 88 MCG
TABLET ORAL
Qty: 4 TABLET | Refills: 5 | OUTPATIENT
Start: 2020-11-30

## 2020-12-12 DIAGNOSIS — E03.9 HYPOTHYROIDISM, UNSPECIFIED TYPE: ICD-10-CM

## 2020-12-14 RX ORDER — LEVOTHYROXINE SODIUM 0.1 MG/1
TABLET ORAL
Qty: 30 TABLET | Refills: 6 | Status: SHIPPED | OUTPATIENT
Start: 2020-12-14 | End: 2021-06-29

## 2020-12-16 DIAGNOSIS — E06.3 HYPOTHYROIDISM DUE TO HASHIMOTO'S THYROIDITIS: ICD-10-CM

## 2020-12-16 DIAGNOSIS — E03.8 HYPOTHYROIDISM DUE TO HASHIMOTO'S THYROIDITIS: ICD-10-CM

## 2020-12-16 RX ORDER — LEVOTHYROXINE SODIUM 88 MCG
TABLET ORAL
Qty: 4 TABLET | Refills: 5 | OUTPATIENT
Start: 2020-12-16

## 2020-12-20 DIAGNOSIS — E03.8 HYPOTHYROIDISM DUE TO HASHIMOTO'S THYROIDITIS: ICD-10-CM

## 2020-12-20 DIAGNOSIS — E06.3 HYPOTHYROIDISM DUE TO HASHIMOTO'S THYROIDITIS: ICD-10-CM

## 2020-12-20 RX ORDER — LEVOTHYROXINE SODIUM 88 MCG
TABLET ORAL
Qty: 4 TABLET | Refills: 5 | OUTPATIENT
Start: 2020-12-20

## 2020-12-30 LAB
ALBUMIN SERPL-MCNC: 4.4 G/DL (ref 3.8–4.9)
ALBUMIN/GLOB SERPL: 1.8 {RATIO} (ref 1.2–2.2)
ALP SERPL-CCNC: 77 IU/L (ref 39–117)
ALT SERPL-CCNC: 27 IU/L (ref 0–32)
AST SERPL-CCNC: 25 IU/L (ref 0–40)
BASOPHILS # BLD AUTO: 0.1 X10E3/UL (ref 0–0.2)
BASOPHILS NFR BLD AUTO: 1 %
BILIRUB SERPL-MCNC: 1 MG/DL (ref 0–1.2)
BUN SERPL-MCNC: 16 MG/DL (ref 6–24)
BUN/CREAT SERPL: 20 (ref 9–23)
CALCIUM SERPL-MCNC: 9.7 MG/DL (ref 8.7–10.2)
CHLORIDE SERPL-SCNC: 105 MMOL/L (ref 96–106)
CO2 SERPL-SCNC: 23 MMOL/L (ref 20–29)
CREAT SERPL-MCNC: 0.8 MG/DL (ref 0.57–1)
EOSINOPHIL # BLD AUTO: 0.1 X10E3/UL (ref 0–0.4)
EOSINOPHIL NFR BLD AUTO: 2 %
ERYTHROCYTE [DISTWIDTH] IN BLOOD BY AUTOMATED COUNT: 12.9 % (ref 11.7–15.4)
EST. AVERAGE GLUCOSE BLD GHB EST-MCNC: 148 MG/DL
GLOBULIN SER-MCNC: 2.5 G/DL (ref 1.5–4.5)
GLUCOSE SERPL-MCNC: 108 MG/DL (ref 65–99)
HBA1C MFR BLD: 6.8 % (ref 4.8–5.6)
HCT VFR BLD AUTO: 44 % (ref 34–46.6)
HGB BLD-MCNC: 14.4 G/DL (ref 11.1–15.9)
IMM GRANULOCYTES # BLD: 0 X10E3/UL (ref 0–0.1)
IMM GRANULOCYTES NFR BLD: 0 %
LYMPHOCYTES # BLD AUTO: 2.4 X10E3/UL (ref 0.7–3.1)
LYMPHOCYTES NFR BLD AUTO: 31 %
MCH RBC QN AUTO: 30.3 PG (ref 26.6–33)
MCHC RBC AUTO-ENTMCNC: 32.7 G/DL (ref 31.5–35.7)
MCV RBC AUTO: 92 FL (ref 79–97)
MONOCYTES # BLD AUTO: 0.6 X10E3/UL (ref 0.1–0.9)
MONOCYTES NFR BLD AUTO: 8 %
NEUTROPHILS # BLD AUTO: 4.4 X10E3/UL (ref 1.4–7)
NEUTROPHILS NFR BLD AUTO: 58 %
PLATELET # BLD AUTO: 217 X10E3/UL (ref 150–450)
POTASSIUM SERPL-SCNC: 4.4 MMOL/L (ref 3.5–5.2)
PROT SERPL-MCNC: 6.9 G/DL (ref 6–8.5)
RBC # BLD AUTO: 4.76 X10E6/UL (ref 3.77–5.28)
SL AMB EGFR AFRICAN AMERICAN: 97 ML/MIN/1.73
SL AMB EGFR NON AFRICAN AMERICAN: 84 ML/MIN/1.73
SODIUM SERPL-SCNC: 144 MMOL/L (ref 134–144)
T4 FREE SERPL-MCNC: 1.3 NG/DL (ref 0.82–1.77)
TESTOST FREE SERPL-MCNC: 2.5 PG/ML (ref 0–4.2)
TESTOST SERPL-MCNC: 6 NG/DL (ref 3–41)
TSH SERPL DL<=0.005 MIU/L-ACNC: 0.29 UIU/ML (ref 0.45–4.5)
WBC # BLD AUTO: 7.6 X10E3/UL (ref 3.4–10.8)

## 2021-01-04 ENCOUNTER — OFFICE VISIT (OUTPATIENT)
Dept: ENDOCRINOLOGY | Facility: HOSPITAL | Age: 56
End: 2021-01-04
Payer: COMMERCIAL

## 2021-01-04 VITALS
SYSTOLIC BLOOD PRESSURE: 122 MMHG | HEART RATE: 90 BPM | WEIGHT: 213 LBS | BODY MASS INDEX: 35.49 KG/M2 | HEIGHT: 65 IN | DIASTOLIC BLOOD PRESSURE: 74 MMHG

## 2021-01-04 DIAGNOSIS — R73.9 HYPERGLYCEMIA: ICD-10-CM

## 2021-01-04 DIAGNOSIS — E06.3 HYPOTHYROIDISM DUE TO HASHIMOTO'S THYROIDITIS: Primary | ICD-10-CM

## 2021-01-04 DIAGNOSIS — E03.8 HYPOTHYROIDISM DUE TO HASHIMOTO'S THYROIDITIS: Primary | ICD-10-CM

## 2021-01-04 PROCEDURE — 99214 OFFICE O/P EST MOD 30 MIN: CPT | Performed by: INTERNAL MEDICINE

## 2021-01-04 RX ORDER — LEVOTHYROXINE SODIUM 88 MCG
TABLET ORAL
Qty: 4 TABLET | Refills: 5 | Status: SHIPPED | OUTPATIENT
Start: 2021-01-04 | End: 2021-06-14

## 2021-01-04 NOTE — PATIENT INSTRUCTIONS
The blood work for the thyroid is still a bit on the overactive side but not as bad as June 2020    for now no change in levothyroxine and synthroid doses  the blood sugars are still in the diabetes range, diet control and getting hopefully back to weight loss is the best    Try to get any exercise or PT in as able  Follow up in 1 year with blood work

## 2021-01-04 NOTE — PROGRESS NOTES
1/5/2021    Assessment/Plan      Diagnoses and all orders for this visit:    Hypothyroidism due to Hashimoto's thyroiditis  -     HEMOGLOBIN A1C W/ EAG ESTIMATION Lab Collect; Future  -     Comprehensive metabolic panel Lab Collect; Future  -     TSH, 3rd generation Lab Collect; Future  -     T4, free Lab Collect; Future  -     HEMOGLOBIN A1C W/ EAG ESTIMATION Lab Collect  -     Comprehensive metabolic panel Lab Collect  -     TSH, 3rd generation Lab Collect  -     T4, free Lab Collect  -     Synthroid 88 MCG tablet; Take 1 tablet on Sunday only    Hyperglycemia  -     HEMOGLOBIN A1C W/ EAG ESTIMATION Lab Collect; Future  -     Comprehensive metabolic panel Lab Collect; Future  -     TSH, 3rd generation Lab Collect; Future  -     T4, free Lab Collect; Future  -     HEMOGLOBIN A1C W/ EAG ESTIMATION Lab Collect  -     Comprehensive metabolic panel Lab Collect  -     TSH, 3rd generation Lab Collect  -     T4, free Lab Collect        Assessment/Plan:  1  Hypothyroidism due to Hashimoto's thyroiditis  Most recent thyroid function tests are still a bit on the overactive side  This is improved from June 2020 with change in thyroid hormone dosage  For now, continue the same thyroid hormone dosage, we'll give it a bit more time for the blood work to improve  2  Hyperglycemia  Her most recent hemoglobin A1c is 6 9% which is in the diabetes range  She needs to work on diet control and exercise again to try to get her weight down and improve her sugars  She does not need any treatment as of yet  Diet can be the treatment for now  I have asked her to follow up in 1 year with preceding hemoglobin A1c, CMP, TSH, and free T4       CC:  Hypothyroid and blood sugar follow-up    History of Present Illness     HPI: Kody Meek is a 48y o  year old female with history of hypothyroidism due to Hashimoto's thyroiditis with a history of hyperglycemia for follow-up visit  She was started on thyroid hormone about 5-6 years ago  She takes levothyroxine 100 mcg 6 days a week and synthroid brand 88 mcg on sundays  She denies heat or cold intolerance but can have very cold feet at night  She denies tremors or palpitation  She has alternating diarrhea and constipation  She has anxiety and depression unchanged  Sleeping is variable and she does not always feels if she sleeps well and is thus fatigued  She has dry skin, brittle nails, and hair loss  Weight is stable  She has no compressive thyroid symptoms or difficulties with swallowing  She had mild hyperglycemia last year with a random blood sugar 138 and hemoglobin A1c of 6 9%  She was told to work on carbohydrate restriction and dietary changes  She denies polyuria or polyphagia but has some polydipsia  She has nocturia 3-5 times a night  She has chronic nerve pain issues from a repetitive strain injury but no extremity numbness or tingling of the feet  She has no blurry vision  Review of Systems   Constitutional: Positive for fatigue  Negative for unexpected weight change  Always fatigued and will have a hard time waking up even after 12 hours of sleep, will not feel refreshed  Had lost some weight, marques back up with no change in habits  HENT: Negative for trouble swallowing  Eyes: Negative for visual disturbance  Wears glasses  Respiratory: Positive for shortness of breath  Negative for chest tightness  SOB with activity  Cardiovascular: Negative for chest pain and palpitations  Gastrointestinal: Positive for constipation and diarrhea  Negative for abdominal pain and nausea  Alternates between diarrhea and constipation  having daily heartburn for the last several months with night time acid reflux  Endocrine: Positive for polydipsia  Negative for cold intolerance, heat intolerance, polyphagia and polyuria  Feet get cold especially at night  wears socks to bed or will stay awake  Arms get cold   Still some occasional hot flashes  Nocturia 3-5 times a night  Gets dry mouth with medications so will drink more  Musculoskeletal: Positive for arthralgias  Left hip and buttocks pain  Skin: Negative for rash  Has dry skin and hair  Has hair loss  Has brittle nails  Neurological: Positive for dizziness and numbness  Negative for tremors, light-headedness and headaches  Has chronic nerve pain issues from repetitive strain injury  Will get dizzy spells  Psychiatric/Behavioral: Positive for dysphoric mood and sleep disturbance  The patient is nervous/anxious  Sleeping variable, not always feel she sleeps well, will use melatonin at night  Has unchanged anxiety and depression  Has a high anxiety day today         Historical Information   Past Medical History:   Diagnosis Date    Anxiety     Depression     Fibromyalgia     Fracture of right lower leg 01/2018    GERD (gastroesophageal reflux disease)     Hyperlipidemia     Myofascial pain dysfunction syndrome     Osteoporosis     Seasonal and perennial allergic rhinitis      Past Surgical History:   Procedure Laterality Date    ANKLE SURGERY Right     KNEE ARTHROTOMY Left     X 3    SINUS SURGERY      X 4    TOTAL KNEE ARTHROPLASTY Left 12/2019    WISDOM TOOTH EXTRACTION       Social History   Social History     Substance and Sexual Activity   Alcohol Use No     Social History     Substance and Sexual Activity   Drug Use No     Social History     Tobacco Use   Smoking Status Never Smoker   Smokeless Tobacco Never Used     Family History:   Family History   Problem Relation Age of Onset    Breast cancer Mother     Thyroid disease unspecified Mother         post partial thyroidectomy    Diabetes unspecified Father     Hypertension Father     Heart attack Father     Heart disease Father     Breast cancer Maternal Grandmother     Diabetes unspecified Maternal Grandfather     Diabetes unspecified Paternal Grandfather     Heart Valve Disease Brother         Has mitral valve disease and had endocarditis       Meds/Allergies   Current Outpatient Medications   Medication Sig Dispense Refill    budesonide (PULMICORT) 0 5 mg/2 mL nebulizer solution TAKE 1 VIAL (2 ML) USING NEBULIZER EVERY 12 HOURS   3    Calcium Carb-Cholecalciferol (CALCIUM-VITAMIN D3) 250-125 MG-UNIT TABS Take 1 tablet by mouth      cetirizine (ZyrTEC) 10 MG chewable tablet Chew daily      HYDROcodone-acetaminophen (NORCO) 5-325 mg per tablet Take 1 tablet by mouth every 6 (six) hours as needed      levothyroxine 100 mcg tablet TAKE 1 TABLET BY MOUTH EVERY DAY (Patient taking differently: TAKE 1 TABLET BY MOUTH 6 days a week) 30 tablet 6    montelukast (SINGULAIR) 10 mg tablet Take 10 mg by mouth      Multiple Vitamins-Minerals (MULTIVITAMIN ADULT EXTRA C PO) Take 1 capsule by mouth daily      pregabalin (LYRICA) 100 mg capsule Take 100 mg by mouth Three times a day      pseudoephedrine (SUDAFED) 120 MG 12 hr tablet Take 120 mg by mouth      sertraline (ZOLOFT) 100 mg tablet Take 100 mg by mouth daily  4    sertraline (ZOLOFT) 50 mg tablet TAKE 1 TABLET DAILY WITH 100MG TABLET  5    simvastatin (ZOCOR) 40 mg tablet Take 40 mg by mouth daily  3    Synthroid 88 MCG tablet Take 1 tablet on Sunday only 4 tablet 5    Triamcinolone Acetonide 55 MCG/ACT AERO into each nostril       No current facility-administered medications for this visit  Allergies   Allergen Reactions    Blueberry Flavor Hives    Clindamycin Hives    Doxycycline Hives    Dust Mite Extract Sneezing    Milk Protein Hives    Orange Oil Hives    Shellfish Allergy Hives    Tomato Hives    Trichophyton Sneezing    Vancomycin      Other reaction(s): Throat Swelling     Wheat Bran Hives    Dicloxacillin Rash    Sulfa Antibiotics Rash       Objective   Vitals: Blood pressure 122/74, pulse 90, height 5' 5" (1 651 m), weight 96 6 kg (213 lb)    Invasive Devices     None                 Physical Exam  Vitals signs reviewed  Constitutional:       Appearance: Normal appearance  She is well-developed  HENT:      Head: Normocephalic and atraumatic  Eyes:      Extraocular Movements: Extraocular movements intact  Conjunctiva/sclera: Conjunctivae normal       Comments: No lid lag, stare, proptosis, or periorbital edema  Neck:      Musculoskeletal: Normal range of motion and neck supple  Thyroid: No thyromegaly  Vascular: No carotid bruit  Comments: Thyroid normal in size  No palpable thyroid nodules  Cardiovascular:      Rate and Rhythm: Normal rate and regular rhythm  Heart sounds: Normal heart sounds  No murmur  Pulmonary:      Effort: Pulmonary effort is normal       Breath sounds: Normal breath sounds  No wheezing  Abdominal:      Palpations: Abdomen is soft  Musculoskeletal: Normal range of motion  General: No deformity  Right lower leg: No edema  Left lower leg: No edema  Comments: No tremor of the outstretched hands  Lymphadenopathy:      Cervical: No cervical adenopathy  Skin:     General: Skin is warm and dry  Findings: No rash  Neurological:      Mental Status: She is alert and oriented to person, place, and time  Deep Tendon Reflexes: Reflexes are normal and symmetric  Comments: Click of left knee when reflex done  Deep tendon reflexes without briskness  The history was obtained from the review of the chart and from the patient  Lab Results:    Blood work done on 12/29/2020 showed hemoglobin A1c of 6 9%      CMP showed a glucose of 108 random but was otherwise normal     Lab Results   Component Value Date    CREATININE 0 80 12/29/2020    CREATININE 0 98 12/26/2019    CREATININE 0 91 08/02/2018    BUN 16 12/29/2020    K 4 4 12/29/2020     12/29/2020    CO2 23 12/29/2020     Lab Results   Component Value Date    ALT 27 12/29/2020    AST 25 12/29/2020     Lab Results   Component Value Date    TSH 0 289 (L) 12/29/2020    FREET4 1 30 12/29/2020     CBC is normal     Total testosterone 6 with a free testosterone of 2 5      Future Appointments   Date Time Provider Flynn Padron   1/5/2022  3:00 PM Roberto Fields MD [facility]

## 2021-03-10 DIAGNOSIS — Z23 ENCOUNTER FOR IMMUNIZATION: ICD-10-CM

## 2021-03-29 ENCOUNTER — IMMUNIZATIONS (OUTPATIENT)
Dept: FAMILY MEDICINE CLINIC | Facility: HOSPITAL | Age: 56
End: 2021-03-29

## 2021-03-29 DIAGNOSIS — Z23 ENCOUNTER FOR IMMUNIZATION: Primary | ICD-10-CM

## 2021-03-29 PROCEDURE — 0001A SARS-COV-2 / COVID-19 MRNA VACCINE (PFIZER-BIONTECH) 30 MCG: CPT

## 2021-03-29 PROCEDURE — 91300 SARS-COV-2 / COVID-19 MRNA VACCINE (PFIZER-BIONTECH) 30 MCG: CPT

## 2021-04-23 ENCOUNTER — IMMUNIZATIONS (OUTPATIENT)
Dept: FAMILY MEDICINE CLINIC | Facility: HOSPITAL | Age: 56
End: 2021-04-23

## 2021-04-23 DIAGNOSIS — Z23 ENCOUNTER FOR IMMUNIZATION: Primary | ICD-10-CM

## 2021-04-23 PROCEDURE — 0002A SARS-COV-2 / COVID-19 MRNA VACCINE (PFIZER-BIONTECH) 30 MCG: CPT

## 2021-04-23 PROCEDURE — 91300 SARS-COV-2 / COVID-19 MRNA VACCINE (PFIZER-BIONTECH) 30 MCG: CPT

## 2021-06-12 DIAGNOSIS — E03.8 HYPOTHYROIDISM DUE TO HASHIMOTO'S THYROIDITIS: ICD-10-CM

## 2021-06-12 DIAGNOSIS — E06.3 HYPOTHYROIDISM DUE TO HASHIMOTO'S THYROIDITIS: ICD-10-CM

## 2021-06-14 RX ORDER — LEVOTHYROXINE SODIUM 88 MCG
TABLET ORAL
Qty: 4 TABLET | Refills: 5 | Status: SHIPPED | OUTPATIENT
Start: 2021-06-14 | End: 2021-07-20 | Stop reason: SDUPTHER

## 2021-07-20 ENCOUNTER — TELEPHONE (OUTPATIENT)
Dept: ENDOCRINOLOGY | Facility: HOSPITAL | Age: 56
End: 2021-07-20

## 2021-07-20 DIAGNOSIS — E06.3 HYPOTHYROIDISM DUE TO HASHIMOTO'S THYROIDITIS: Primary | ICD-10-CM

## 2021-07-20 DIAGNOSIS — E03.8 HYPOTHYROIDISM DUE TO HASHIMOTO'S THYROIDITIS: Primary | ICD-10-CM

## 2021-07-20 DIAGNOSIS — E03.9 HYPOTHYROIDISM, UNSPECIFIED TYPE: ICD-10-CM

## 2021-07-20 PROBLEM — M85.852 OSTEOPENIA OF LEFT HIP: Status: ACTIVE | Noted: 2021-07-20

## 2021-07-20 RX ORDER — LEVOTHYROXINE SODIUM 0.1 MG/1
TABLET ORAL
Qty: 30 TABLET | Refills: 6
Start: 2021-07-20 | End: 2022-02-28

## 2021-07-20 RX ORDER — LEVOTHYROXINE SODIUM 88 MCG
TABLET ORAL
Qty: 12 TABLET | Refills: 5
Start: 2021-07-20 | End: 2021-07-21 | Stop reason: SDUPTHER

## 2021-07-21 DIAGNOSIS — E03.8 HYPOTHYROIDISM DUE TO HASHIMOTO'S THYROIDITIS: ICD-10-CM

## 2021-07-21 DIAGNOSIS — E06.3 HYPOTHYROIDISM DUE TO HASHIMOTO'S THYROIDITIS: ICD-10-CM

## 2021-07-21 RX ORDER — LEVOTHYROXINE SODIUM 88 MCG
TABLET ORAL
Qty: 12 TABLET | Refills: 5 | Status: SHIPPED | OUTPATIENT
Start: 2021-07-21 | End: 2022-01-03

## 2022-01-03 DIAGNOSIS — E03.8 HYPOTHYROIDISM DUE TO HASHIMOTO'S THYROIDITIS: ICD-10-CM

## 2022-01-03 DIAGNOSIS — E06.3 HYPOTHYROIDISM DUE TO HASHIMOTO'S THYROIDITIS: ICD-10-CM

## 2022-01-03 RX ORDER — LEVOTHYROXINE SODIUM 88 MCG
TABLET ORAL
Qty: 12 TABLET | Refills: 5 | Status: SHIPPED | OUTPATIENT
Start: 2022-01-03 | End: 2022-06-10

## 2022-01-04 LAB
ALBUMIN SERPL-MCNC: 4.8 G/DL (ref 3.8–4.9)
ALBUMIN/GLOB SERPL: 2.3 {RATIO} (ref 1.2–2.2)
ALP SERPL-CCNC: 93 IU/L (ref 44–121)
ALT SERPL-CCNC: 40 IU/L (ref 0–32)
AST SERPL-CCNC: 27 IU/L (ref 0–40)
BILIRUB SERPL-MCNC: 0.7 MG/DL (ref 0–1.2)
BUN SERPL-MCNC: 18 MG/DL (ref 6–24)
BUN/CREAT SERPL: 20 (ref 9–23)
CALCIUM SERPL-MCNC: 9.5 MG/DL (ref 8.7–10.2)
CHLORIDE SERPL-SCNC: 105 MMOL/L (ref 96–106)
CO2 SERPL-SCNC: 25 MMOL/L (ref 20–29)
CREAT SERPL-MCNC: 0.9 MG/DL (ref 0.57–1)
EST. AVERAGE GLUCOSE BLD GHB EST-MCNC: 163 MG/DL
GLOBULIN SER-MCNC: 2.1 G/DL (ref 1.5–4.5)
GLUCOSE SERPL-MCNC: 154 MG/DL (ref 65–99)
HBA1C MFR BLD: 7.3 % (ref 4.8–5.6)
POTASSIUM SERPL-SCNC: 4.3 MMOL/L (ref 3.5–5.2)
PROT SERPL-MCNC: 6.9 G/DL (ref 6–8.5)
SL AMB EGFR AFRICAN AMERICAN: 83 ML/MIN/1.73
SL AMB EGFR NON AFRICAN AMERICAN: 72 ML/MIN/1.73
SODIUM SERPL-SCNC: 144 MMOL/L (ref 134–144)
T4 FREE SERPL-MCNC: 1.27 NG/DL (ref 0.82–1.77)
TSH SERPL DL<=0.005 MIU/L-ACNC: 0.78 UIU/ML (ref 0.45–4.5)

## 2022-01-05 ENCOUNTER — OFFICE VISIT (OUTPATIENT)
Dept: ENDOCRINOLOGY | Facility: HOSPITAL | Age: 57
End: 2022-01-05
Payer: COMMERCIAL

## 2022-01-05 VITALS
WEIGHT: 219.2 LBS | HEIGHT: 65 IN | DIASTOLIC BLOOD PRESSURE: 70 MMHG | HEART RATE: 94 BPM | SYSTOLIC BLOOD PRESSURE: 118 MMHG | BODY MASS INDEX: 36.52 KG/M2

## 2022-01-05 DIAGNOSIS — R73.9 HYPERGLYCEMIA: ICD-10-CM

## 2022-01-05 DIAGNOSIS — E03.8 HYPOTHYROIDISM DUE TO HASHIMOTO'S THYROIDITIS: Primary | ICD-10-CM

## 2022-01-05 DIAGNOSIS — E11.65 TYPE 2 DIABETES MELLITUS WITH HYPERGLYCEMIA, WITHOUT LONG-TERM CURRENT USE OF INSULIN (HCC): ICD-10-CM

## 2022-01-05 DIAGNOSIS — E06.3 HYPOTHYROIDISM DUE TO HASHIMOTO'S THYROIDITIS: Primary | ICD-10-CM

## 2022-01-05 PROCEDURE — 99214 OFFICE O/P EST MOD 30 MIN: CPT | Performed by: INTERNAL MEDICINE

## 2022-01-05 NOTE — PROGRESS NOTES
1/5/2022    Assessment/Plan      Diagnoses and all orders for this visit:    Hypothyroidism due to Hashimoto's thyroiditis  -     HEMOGLOBIN A1C W/ EAG ESTIMATION Lab Collect; Future  -     Comprehensive metabolic panel Lab Collect; Future  -     TSH, 3rd generation Lab Collect; Future  -     T4, free Lab Collect; Future  -     Microalbumin / creatinine urine ratio Lab Collect; Future  -     CBC and differential Lab Collect; Future  -     HEMOGLOBIN A1C W/ EAG ESTIMATION Lab Collect  -     Comprehensive metabolic panel Lab Collect  -     TSH, 3rd generation Lab Collect  -     T4, free Lab Collect  -     Microalbumin / creatinine urine ratio Lab Collect  -     CBC and differential Lab Collect    Hyperglycemia  -     HEMOGLOBIN A1C W/ EAG ESTIMATION Lab Collect; Future  -     Comprehensive metabolic panel Lab Collect; Future  -     TSH, 3rd generation Lab Collect; Future  -     T4, free Lab Collect; Future  -     Microalbumin / creatinine urine ratio Lab Collect; Future  -     CBC and differential Lab Collect; Future  -     HEMOGLOBIN A1C W/ EAG ESTIMATION Lab Collect  -     Comprehensive metabolic panel Lab Collect  -     TSH, 3rd generation Lab Collect  -     T4, free Lab Collect  -     Microalbumin / creatinine urine ratio Lab Collect  -     CBC and differential Lab Collect    Type 2 diabetes mellitus with hyperglycemia, without long-term current use of insulin ([facility])  -     HEMOGLOBIN A1C W/ EAG ESTIMATION Lab Collect; Future  -     Comprehensive metabolic panel Lab Collect; Future  -     TSH, 3rd generation Lab Collect; Future  -     T4, free Lab Collect; Future  -     Microalbumin / creatinine urine ratio Lab Collect; Future  -     CBC and differential Lab Collect;  Future  -     HEMOGLOBIN A1C W/ EAG ESTIMATION Lab Collect  -     Comprehensive metabolic panel Lab Collect  -     TSH, 3rd generation Lab Collect  -     T4, free Lab Collect  -     Microalbumin / creatinine urine ratio Lab Collect  -     CBC and differential Lab Collect        Assessment/Plan:  1  Hypothyroidism due to Hashimoto's thyroiditis  Most recent thyroid function tests are normal   She is biochemically euthyroid  She will continue the same levothyroxine and Synthroid dosages  2  Hyperglycemia  Her most recent hemoglobin A1c is 7 3% and this is consistent with the onset of type 2 diabetes  Typically, treatment for diabetes is initiated with oral agents when hemoglobin A1c is over 7  She admits she has not been working on her diet so would like to try working on that 1st   She would like to work on weight loss and exercise in addition  If she has not had a repeat hemoglobin A1c within 6 months, I have asked her to call and I will order 1 but she may be having 1 performed through her primary care physician  I have informed her that the 1st line treatment after diet exercise would be metformin usage  I have asked her to follow up in 1 year with preceding hemoglobin A1c, CMP, CBC, TSH, free T4, and urine microalbumin to creatinine ratio  CC:  Hypothyroid and hyperglycemia follow-up    History of Present Illness     HPI: Mathieu Simmons is a 55y o  year old female with history of hypothyroidism due to Hashimoto's thyroiditis with history of hyperglycemia for follow-up visit  She was started on thyroid hormone about 6-7 years ago  She takes levothyroxine 100 mcg 4 days a week and Synthroid brand 88 mcg 3 days a week  She denies heat or cold intolerance, palpitation, or tremors  Weight is 6 lb more than last year  She is still always fatigued  She has some difficulty falling asleep some nights but her anxiety and depression are stable  She has alternating diarrhea and constipation  She still is losing a lot hair and has very dry skin with brittle nails  She denies diplopia  She denies compressive thyroid symptoms or difficulties with swallowing      She has had mild hyperglycemia in 2020 with random sugar 138 and hemoglobin A1c of 6 9%  She was working on carbohydrate restriction dietary changes  She continued to have a hemoglobin A1c of 6 9% in 2021  She has polyuria polyphagia but no polydipsia  She has nocturia 1-5 times a night  She will get dry mouth with her drugs  She denies chest pain but has shortness of breath at times  She denies blurry vision  She denies numbness or tingling of the feet  Seeing a doctor at [facility] for increase in dizzy spells  Has had a full evaluation  Tried migraine diet and no help  Will get sinus headaches and tension headaches from her neck  Has tried several medications with no help  Decreasing diet soda helped some  Now not daily  Significant motion sickness symptoms, worse it has ever been in her life  MRI negative of the brain  Review of Systems   Constitutional: Positive for fatigue  Negative for unexpected weight change  Weight 6 lb more than last year  still always fatigued  HENT: Negative for trouble swallowing  Eyes: Negative for visual disturbance  Wears glasses  Respiratory: Positive for shortness of breath  Negative for chest tightness  SOB with activity  Cardiovascular: Negative for chest pain and palpitations  Gastrointestinal: Positive for constipation and diarrhea  Negative for abdominal pain and nausea  Alternating diarrhea and constipation  Endocrine: Positive for polyphagia and polyuria  Negative for cold intolerance, heat intolerance and polydipsia  Arms and feet cold, still occasional hot flash  Some polyuria  Nocturia 1-5 times  A Night  Occasional hunger  Will get dry mouth from the drugs  Skin: Positive for rash  Negative for wound  Still losing a lot of hair  Has very dry skin  Has brittle nails  Getting mid trunk rashes  Neurological: Positive for dizziness and headaches  Negative for tremors, light-headedness and numbness          Some numbness from FMG from neck issues, it is in the hands and slightly on left buttocks to left foot  Psychiatric/Behavioral: Positive for dysphoric mood and sleep disturbance  The patient is nervous/anxious  Some nights difficulty falling asleep  Anxiety and depression stable          Historical Information   Past Medical History:   Diagnosis Date    Anxiety     Depression     Dizzy spells     Fibromyalgia     Fracture of right lower leg 01/2018    GERD (gastroesophageal reflux disease)     Hyperlipidemia     Myofascial pain dysfunction syndrome     Osteoporosis     Seasonal and perennial allergic rhinitis      Past Surgical History:   Procedure Laterality Date    ANKLE SURGERY Right     KNEE ARTHROTOMY Left     X 3    SINUS SURGERY      X 4    TOTAL KNEE ARTHROPLASTY Left 12/2019    WISDOM TOOTH EXTRACTION       Social History   Social History     Substance and Sexual Activity   Alcohol Use No     Social History     Substance and Sexual Activity   Drug Use No     Social History     Tobacco Use   Smoking Status Never Smoker   Smokeless Tobacco Never Used     Family History:   Family History   Problem Relation Age of Onset   Russell Guerrero Breast cancer Mother     Thyroid disease unspecified Mother         post partial thyroidectomy    Diabetes unspecified Father     Hypertension Father     Heart attack Father     Heart disease Father     Breast cancer Maternal Grandmother     Diabetes unspecified Maternal Grandfather     Diabetes unspecified Paternal Grandfather     Heart Valve Disease Brother         Has mitral valve disease and had endocarditis       Meds/Allergies   Current Outpatient Medications   Medication Sig Dispense Refill    budesonide (PULMICORT) 0 5 mg/2 mL nebulizer solution TAKE 1 VIAL (2 ML) USING NEBULIZER EVERY 12 HOURS   3    Calcium Carb-Cholecalciferol (CALCIUM-VITAMIN D3) 250-125 MG-UNIT TABS Take 1 tablet by mouth      cetirizine (ZyrTEC) 10 MG chewable tablet Chew daily      HYDROcodone-acetaminophen (NORCO) 5-325 mg per tablet Take 1 tablet by mouth every 6 (six) hours as needed      levothyroxine 100 mcg tablet TAKE 1 TABLET BY MOUTH 4 days a week 30 tablet 6    montelukast (SINGULAIR) 10 mg tablet Take 10 mg by mouth      Multiple Vitamins-Minerals (MULTIVITAMIN ADULT EXTRA C PO) Take 1 capsule by mouth daily      pregabalin (LYRICA) 100 mg capsule Take 100 mg by mouth Three times a day      pseudoephedrine (SUDAFED) 120 MG 12 hr tablet Take 120 mg by mouth as needed        sertraline (ZOLOFT) 100 mg tablet Take 100 mg by mouth daily  4    sertraline (ZOLOFT) 50 mg tablet TAKE 1 TABLET DAILY WITH 100MG TABLET  5    simvastatin (ZOCOR) 40 mg tablet Take 40 mg by mouth daily  3    Synthroid 88 MCG tablet TAKE 1 TABLET BY MOUTH 3 TIMES A WEEK 12 tablet 5    Triamcinolone Acetonide 55 MCG/ACT AERO into each nostril       No current facility-administered medications for this visit  Allergies   Allergen Reactions    Blueberry Flavor - Food Allergy Hives    Clindamycin Hives    Doxycycline Hives    Dust Mite Extract Sneezing    Milk Protein - Food Allergy Hives    Orange Oil - Food Allergy Hives    Shellfish Allergy - Food Allergy Hives    Tomato - Food Allergy Hives    Trichophyton Sneezing    Vancomycin      Other reaction(s): Throat Swelling     Wheat Bran - Food Allergy Hives    Dicloxacillin Rash    Sulfa Antibiotics Rash       Objective   Vitals: Blood pressure 118/70, pulse 94, height 5' 5" (1 651 m), weight 99 4 kg (219 lb 3 2 oz)  Invasive Devices  Report    None                 Physical Exam  Vitals reviewed  Constitutional:       Appearance: Normal appearance  She is well-developed  HENT:      Head: Normocephalic and atraumatic  Eyes:      Extraocular Movements: Extraocular movements intact  Conjunctiva/sclera: Conjunctivae normal       Comments: No lid lag, stare, proptosis, or periorbital edema  Neck:      Thyroid: No thyromegaly  Vascular: No carotid bruit        Comments: Thyroid normal in size  No palpable thyroid nodules  Cardiovascular:      Rate and Rhythm: Normal rate and regular rhythm  Heart sounds: Normal heart sounds  No murmur heard  Pulmonary:      Effort: Pulmonary effort is normal       Breath sounds: Normal breath sounds  No wheezing  Abdominal:      Palpations: Abdomen is soft  Musculoskeletal:         General: No deformity  Normal range of motion  Cervical back: Normal range of motion and neck supple  Right lower leg: No edema  Left lower leg: No edema  Comments: No tremor of the outstretched hands  Lymphadenopathy:      Cervical: No cervical adenopathy  Skin:     General: Skin is warm and dry  Findings: No rash  Neurological:      Mental Status: She is alert and oriented to person, place, and time  Deep Tendon Reflexes: Reflexes are normal and symmetric  Comments: Deep tendon reflexes normal          The history was obtained from the review of the chart and from the patient  Lab Results:      Blood work done on 01/03/2022 showed a CMP with a glucose of 154 fasting  Hemoglobin A1c is 7 3%      Lab Results   Component Value Date    CREATININE 0 90 01/03/2022    CREATININE 0 80 12/29/2020    CREATININE 0 98 12/26/2019    BUN 18 01/03/2022    K 4 3 01/03/2022     01/03/2022    CO2 25 01/03/2022       Lab Results   Component Value Date    ALT 40 (H) 01/03/2022    AST 27 01/03/2022       Lab Results   Component Value Date    TSH 0 781 01/03/2022    FREET4 1 27 01/03/2022       Future Appointments   Date Time Provider Flynn Padron   1/11/2023  3:00 PM Bárbara Geronimo MD [facility]

## 2022-01-05 NOTE — PATIENT INSTRUCTIONS
The thyroid blood work is normal   Continue the same synthroid and levothyroxine doses for now  the blood sugars demonstrate worsening diabetes  Work on carbohydrate limiting and weight loss and exercise  if this does not help, could start metformin  Follow up in 1 year with blood work

## 2022-06-10 DIAGNOSIS — E03.8 HYPOTHYROIDISM DUE TO HASHIMOTO'S THYROIDITIS: ICD-10-CM

## 2022-06-10 DIAGNOSIS — E06.3 HYPOTHYROIDISM DUE TO HASHIMOTO'S THYROIDITIS: ICD-10-CM

## 2022-06-10 RX ORDER — LEVOTHYROXINE SODIUM 88 MCG
TABLET ORAL
Qty: 12 TABLET | Refills: 5 | Status: SHIPPED | OUTPATIENT
Start: 2022-06-10

## 2022-11-19 DIAGNOSIS — E03.8 HYPOTHYROIDISM DUE TO HASHIMOTO'S THYROIDITIS: ICD-10-CM

## 2022-11-19 DIAGNOSIS — E06.3 HYPOTHYROIDISM DUE TO HASHIMOTO'S THYROIDITIS: ICD-10-CM

## 2022-11-21 RX ORDER — LEVOTHYROXINE SODIUM 88 MCG
TABLET ORAL
Qty: 12 TABLET | Refills: 5 | Status: SHIPPED | OUTPATIENT
Start: 2022-11-21

## 2023-01-05 LAB
ALBUMIN SERPL-MCNC: 4.8 G/DL (ref 3.8–4.9)
ALBUMIN/CREAT UR: 3 MG/G CREAT (ref 0–29)
ALBUMIN/GLOB SERPL: 2.5 {RATIO} (ref 1.2–2.2)
ALP SERPL-CCNC: 80 IU/L (ref 44–121)
ALT SERPL-CCNC: 24 IU/L (ref 0–32)
AST SERPL-CCNC: 18 IU/L (ref 0–40)
BASOPHILS # BLD AUTO: 0.1 X10E3/UL (ref 0–0.2)
BASOPHILS NFR BLD AUTO: 1 %
BILIRUB SERPL-MCNC: 0.9 MG/DL (ref 0–1.2)
BUN SERPL-MCNC: 17 MG/DL (ref 6–24)
BUN/CREAT SERPL: 19 (ref 9–23)
CALCIUM SERPL-MCNC: 9.8 MG/DL (ref 8.7–10.2)
CHLORIDE SERPL-SCNC: 103 MMOL/L (ref 96–106)
CO2 SERPL-SCNC: 25 MMOL/L (ref 20–29)
CREAT SERPL-MCNC: 0.88 MG/DL (ref 0.57–1)
CREAT UR-MCNC: 125.5 MG/DL
EGFR: 77 ML/MIN/1.73
EOSINOPHIL # BLD AUTO: 0.4 X10E3/UL (ref 0–0.4)
EOSINOPHIL NFR BLD AUTO: 5 %
ERYTHROCYTE [DISTWIDTH] IN BLOOD BY AUTOMATED COUNT: 12.9 % (ref 11.7–15.4)
EST. AVERAGE GLUCOSE BLD GHB EST-MCNC: 166 MG/DL
GLOBULIN SER-MCNC: 1.9 G/DL (ref 1.5–4.5)
GLUCOSE SERPL-MCNC: 135 MG/DL (ref 70–99)
HBA1C MFR BLD: 7.4 % (ref 4.8–5.6)
HCT VFR BLD AUTO: 44.4 % (ref 34–46.6)
HGB BLD-MCNC: 14.5 G/DL (ref 11.1–15.9)
IMM GRANULOCYTES # BLD: 0.1 X10E3/UL (ref 0–0.1)
IMM GRANULOCYTES NFR BLD: 1 %
LYMPHOCYTES # BLD AUTO: 3.2 X10E3/UL (ref 0.7–3.1)
LYMPHOCYTES NFR BLD AUTO: 37 %
MCH RBC QN AUTO: 29.7 PG (ref 26.6–33)
MCHC RBC AUTO-ENTMCNC: 32.7 G/DL (ref 31.5–35.7)
MCV RBC AUTO: 91 FL (ref 79–97)
MICROALBUMIN UR-MCNC: 4.2 UG/ML
MONOCYTES # BLD AUTO: 0.6 X10E3/UL (ref 0.1–0.9)
MONOCYTES NFR BLD AUTO: 7 %
NEUTROPHILS # BLD AUTO: 4.3 X10E3/UL (ref 1.4–7)
NEUTROPHILS NFR BLD AUTO: 49 %
PLATELET # BLD AUTO: 222 X10E3/UL (ref 150–450)
POTASSIUM SERPL-SCNC: 4.3 MMOL/L (ref 3.5–5.2)
PROT SERPL-MCNC: 6.7 G/DL (ref 6–8.5)
RBC # BLD AUTO: 4.89 X10E6/UL (ref 3.77–5.28)
SODIUM SERPL-SCNC: 141 MMOL/L (ref 134–144)
T4 FREE SERPL-MCNC: 1.21 NG/DL (ref 0.82–1.77)
TSH SERPL DL<=0.005 MIU/L-ACNC: 0.43 UIU/ML (ref 0.45–4.5)
WBC # BLD AUTO: 8.6 X10E3/UL (ref 3.4–10.8)

## 2023-01-11 ENCOUNTER — OFFICE VISIT (OUTPATIENT)
Dept: ENDOCRINOLOGY | Facility: HOSPITAL | Age: 58
End: 2023-01-11

## 2023-01-11 VITALS
HEIGHT: 65 IN | DIASTOLIC BLOOD PRESSURE: 78 MMHG | WEIGHT: 205.4 LBS | BODY MASS INDEX: 34.22 KG/M2 | SYSTOLIC BLOOD PRESSURE: 118 MMHG | HEART RATE: 83 BPM

## 2023-01-11 DIAGNOSIS — E06.3 HYPOTHYROIDISM DUE TO HASHIMOTO'S THYROIDITIS: Primary | ICD-10-CM

## 2023-01-11 DIAGNOSIS — E03.8 HYPOTHYROIDISM DUE TO HASHIMOTO'S THYROIDITIS: Primary | ICD-10-CM

## 2023-01-11 DIAGNOSIS — E11.65 TYPE 2 DIABETES MELLITUS WITH HYPERGLYCEMIA, WITHOUT LONG-TERM CURRENT USE OF INSULIN (HCC): ICD-10-CM

## 2023-01-11 RX ORDER — MELATONIN
4000 DAILY
COMMUNITY

## 2023-01-11 RX ORDER — METFORMIN HYDROCHLORIDE 500 MG/1
500 TABLET, EXTENDED RELEASE ORAL 2 TIMES DAILY
COMMUNITY
Start: 2022-12-25

## 2023-01-11 NOTE — PROGRESS NOTES
1/12/2023    Assessment/Plan      Diagnoses and all orders for this visit:    Hypothyroidism due to Hashimoto's thyroiditis  -     TSH, 3rd generation Lab Collect; Future  -     T4, free Lab Collect; Future  -     TSH, 3rd generation Lab Collect  -     T4, free Lab Collect  -     HEMOGLOBIN A1C W/ EAG ESTIMATION Lab Collect; Future  -     Comprehensive metabolic panel Lab Collect; Future  -     CBC and differential Lab Collect; Future  -     Microalbumin / creatinine urine ratio Lab Collect; Future  -     TSH, 3rd generation Lab Collect; Future  -     T4, free Lab Collect; Future  -     HEMOGLOBIN A1C W/ EAG ESTIMATION Lab Collect  -     Comprehensive metabolic panel Lab Collect  -     CBC and differential Lab Collect  -     Microalbumin / creatinine urine ratio Lab Collect  -     TSH, 3rd generation Lab Collect  -     T4, free Lab Collect    Type 2 diabetes mellitus with hyperglycemia, without long-term current use of insulin ([facility])  -     HEMOGLOBIN A1C W/ EAG ESTIMATION Lab Collect; Future  -     Comprehensive metabolic panel Lab Collect; Future  -     CBC and differential Lab Collect; Future  -     Microalbumin / creatinine urine ratio Lab Collect; Future  -     TSH, 3rd generation Lab Collect; Future  -     T4, free Lab Collect; Future  -     HEMOGLOBIN A1C W/ EAG ESTIMATION Lab Collect  -     Comprehensive metabolic panel Lab Collect  -     CBC and differential Lab Collect  -     Microalbumin / creatinine urine ratio Lab Collect  -     TSH, 3rd generation Lab Collect  -     T4, free Lab Collect    Other orders  -     metFORMIN (GLUCOPHAGE-XR) 500 mg 24 hr tablet; Take 500 mg by mouth 2 (two) times a day  -     cholecalciferol (VITAMIN D3) 1,000 units tablet; Take 4,000 Units by mouth daily        Assessment/Plan:  1  Hypothyroidism due to Hashimoto's thyroiditis  Recent thyroid function studies do show her TSH is slightly low signifying mild biochemical hyperthyroidism    Very slight and since she is asymptomatic, she is also been on this dose of thyroid hormone for quite some time, I will leave her on the same dose of Synthroid brand 88 mcg 3 days a week and 100 mcg 4 days a week  I will repeat her thyroid function studies in the summer months and adjust if necessary  2   Type 2 diabetes  She has type 2 diabetes and is now on metformin XR with improved blood sugars  Recent hemoglobin A1c is 7 4%  She is following with her primary care physician regarding this problem  I have reassured her that her urine microalbumin to creatinine ratio was normal so she has no evidence of diabetes effects on the kidneys  I have asked her to repeat a TSH and free T4 in the summer  I have asked her to follow-up in 1 year with preceding TSH, free T4, hemoglobin A1c, CMP, CBC, and urine microalbumin to creatinine ratio  CC: Hypothyroid and diabetes follow-up    History of Present Illness     HPI: Dedrick Gonzalez is a 65y o  year old female with history of hypothyroidism due to Hashimoto's thyroiditis for follow-up visit  She was started on thyroid hormone about 6-7 years ago  She takes levothyroxine 100 mcg 4 days a week and Synthroid brand 88 mcg 3 days a week  Weight is 14 pounds less than last year  She is fatigued  She says her arms and feet tend to get cold but the rest of her is more warm and she has no actual heat or cold intolerance  She denies palpitation or tremors  He has diarrhea and some depression still  She denies insomnia, anxiety, or constipation  She has dry skin and an increase in hair loss  She denies diplopia  She denies compressive thyroid symptoms or difficulties with swallowing  She has since been diagnosed with type 2 diabetes and is taking metformin  mg twice a day per her primary care physician  Annual blood work in [address] [place] [address] 2022 had hgba1c at 9 2% with blood sugar of 228  Tried immediate release metformin and not tolerate   XR version is a bit better but sometimes not tolerating this  Has lost some weight  checking sugars  130s in am and can be 120s with rare 140s  Pre supper  sometimes 110-160  Has a rare low blood sugar  Trying to watch the carbs  Review of Systems   Constitutional: Positive for fatigue  Negative for unexpected weight change  Weight 14 lbs less than last visit  HENT: Negative for trouble swallowing  Eyes: Negative for visual disturbance  Wears glasses  Respiratory: Positive for shortness of breath  Negative for chest tightness  SOB going up stairs or walking too fast     Cardiovascular: Negative for chest pain and palpitations  Gastrointestinal: Positive for diarrhea  Negative for abdominal pain, constipation and nausea  Endocrine: Negative for cold intolerance and heat intolerance  Arms and feet tend to get cold, rest of her body more hot  Musculoskeletal: Positive for arthralgias  Some improvement in hip and buttocks pain and right knee still hurts  Hope to get more active soon  Skin: Negative for rash  Has dry skin  Has dry hair and nails  Has hair loss increase  More consistent hair loss  Neurological: Positive for dizziness  Negative for tremors, light-headedness and headaches  Gets dizzy spells at times from inner ear damage  Psychiatric/Behavioral: Positive for dysphoric mood  Negative for sleep disturbance  The patient is not nervous/anxious  Now diagnosed with sleep apnea  To see pulmonary next week  Some depression still         Historical Information   Past Medical History:   Diagnosis Date   • Anxiety    • Depression    • Dizzy spells    • Fibromyalgia    • Fracture of right lower leg 01/2018   • GERD (gastroesophageal reflux disease)    • Hyperlipidemia    • Myofascial pain dysfunction syndrome    • Osteoporosis    • Seasonal and perennial allergic rhinitis      Past Surgical History:   Procedure Laterality Date   • ANKLE SURGERY Right    • KNEE ARTHROTOMY Left     X 3   • SINUS SURGERY      X 4   • TOTAL KNEE ARTHROPLASTY Left 12/2019   • WISDOM TOOTH EXTRACTION       Social History   Social History     Substance and Sexual Activity   Alcohol Use No     Social History     Substance and Sexual Activity   Drug Use No     Social History     Tobacco Use   Smoking Status Never   Smokeless Tobacco Never     Family History:   Family History   Problem Relation Age of Onset   • Breast cancer Mother    • Thyroid disease unspecified Mother         post partial thyroidectomy   • Diabetes unspecified Father    • Hypertension Father    • Heart attack Father    • Heart disease Father    • Breast cancer Maternal Grandmother    • Diabetes unspecified Maternal Grandfather    • Diabetes unspecified Paternal Grandfather    • Heart Valve Disease Brother         Has mitral valve disease and had endocarditis       Meds/Allergies   Current Outpatient Medications   Medication Sig Dispense Refill   • budesonide (PULMICORT) 0 5 mg/2 mL nebulizer solution TAKE 1 VIAL (2 ML) USING NEBULIZER EVERY 12 HOURS   3   • Calcium Carb-Cholecalciferol (CALCIUM-VITAMIN D3) 250-125 MG-UNIT TABS Take 1 tablet by mouth     • cetirizine (ZyrTEC) 10 MG chewable tablet Chew daily     • cholecalciferol (VITAMIN D3) 1,000 units tablet Take 4,000 Units by mouth daily     • levothyroxine 100 mcg tablet TAKE 1 TABLET BY MOUTH 4 DAYS A WEEK 26 tablet 8   • metFORMIN (GLUCOPHAGE-XR) 500 mg 24 hr tablet Take 500 mg by mouth 2 (two) times a day     • montelukast (SINGULAIR) 10 mg tablet Take 10 mg by mouth     • Multiple Vitamins-Minerals (MULTIVITAMIN ADULT EXTRA C PO) Take 1 capsule by mouth daily     • pregabalin (LYRICA) 100 mg capsule Take 100 mg by mouth Three times a day     • pseudoephedrine (SUDAFED) 120 MG 12 hr tablet Take 120 mg by mouth as needed       • sertraline (ZOLOFT) 100 mg tablet Take 100 mg by mouth daily  4   • sertraline (ZOLOFT) 50 mg tablet TAKE 1 TABLET DAILY WITH 100MG TABLET  5   • simvastatin (ZOCOR) 40 mg tablet Take 40 mg by mouth daily  3   • Synthroid 88 MCG tablet TAKE 1 TABLET BY MOUTH THREE TIMES A WEEK  12 tablet 5   • Triamcinolone Acetonide 55 MCG/ACT AERO into each nostril     • HYDROcodone-acetaminophen (NORCO) 5-325 mg per tablet Take 1 tablet by mouth every 6 (six) hours as needed (Patient not taking: Reported on 1/11/2023)       No current facility-administered medications for this visit  Allergies   Allergen Reactions   • Blueberry Flavor - Food Allergy Hives   • Clindamycin Hives   • Doxycycline Hives   • Dust Mite Extract Sneezing   • Milk Protein - Food Allergy Hives   • Orange Oil - Food Allergy Hives   • Shellfish Allergy - Food Allergy Hives   • Tomato - Food Allergy Hives   • Trichophyton Sneezing   • Vancomycin      Other reaction(s): Throat Swelling    • Wheat Bran - Food Allergy Hives   • Dicloxacillin Rash   • Sulfa Antibiotics Rash       Objective   Vitals: Blood pressure 118/78, pulse 83, height 5' 5" (1 651 m), weight 93 2 kg (205 lb 6 4 oz)  Invasive Devices     None                 Physical Exam  Vitals reviewed  Constitutional:       Appearance: Normal appearance  She is well-developed  HENT:      Head: Normocephalic and atraumatic  Eyes:      Extraocular Movements: Extraocular movements intact  Conjunctiva/sclera: Conjunctivae normal       Comments: No lid lag, stare, proptosis, or periorbital edema  Neck:      Thyroid: No thyromegaly  Vascular: No carotid bruit  Comments: Thyroid normal in size  No palpable thyroid nodules  Cardiovascular:      Rate and Rhythm: Normal rate and regular rhythm  Heart sounds: Normal heart sounds  No murmur heard  Pulmonary:      Effort: Pulmonary effort is normal       Breath sounds: Normal breath sounds  No wheezing  Abdominal:      Palpations: Abdomen is soft  Musculoskeletal:         General: No deformity  Normal range of motion        Cervical back: Normal range of motion and neck supple  Right lower leg: No edema  Left lower leg: No edema  Comments: No tremor of the outstretched hands  Lymphadenopathy:      Cervical: No cervical adenopathy  Skin:     General: Skin is warm and dry  Findings: No rash  Neurological:      Mental Status: She is alert and oriented to person, place, and time  Deep Tendon Reflexes: Reflexes are normal and symmetric  Comments: Patellar deep tendon reflexes normal          The history was obtained from the review of the chart and from the patient  Lab Results:      Blood work done on 1/4/2023 showed a hemoglobin A1c of 7 4%  CMP showed glucose of 135 fasting but was otherwise normal     Urine microalbumin to creatinine ratio was 3      CBC is normal     Lab Results   Component Value Date    CREATININE 0 88 01/04/2023    CREATININE 0 90 01/03/2022    CREATININE 0 80 12/29/2020    BUN 17 01/04/2023    K 4 3 01/04/2023     01/04/2023    CO2 25 01/04/2023     eGFR   Date Value Ref Range Status   01/04/2023 77 >59 mL/min/1 73 Final       Lab Results   Component Value Date    ALT 24 01/04/2023    AST 18 01/04/2023       Lab Results   Component Value Date    TSH 0 428 (L) 01/04/2023    FREET4 1 21 01/04/2023       Future Appointments   Date Time Provider Flynn Padron   1/9/2024  3:00 PM Guicho Dykes MD [facility]

## 2023-01-11 NOTE — PATIENT INSTRUCTIONS
The thyroid blood work is ok, on the overactive side slightly  No change in thyroid medicine for now  We'll recheck the thyroid in summer  Follow up in 1 year with blood work

## 2023-03-20 DIAGNOSIS — E03.9 HYPOTHYROIDISM, UNSPECIFIED TYPE: ICD-10-CM

## 2023-03-20 RX ORDER — LEVOTHYROXINE SODIUM 0.1 MG/1
TABLET ORAL
Qty: 12 TABLET | Refills: 19 | Status: SHIPPED | OUTPATIENT
Start: 2023-03-20

## 2023-05-12 DIAGNOSIS — E03.8 HYPOTHYROIDISM DUE TO HASHIMOTO'S THYROIDITIS: ICD-10-CM

## 2023-05-12 DIAGNOSIS — E06.3 HYPOTHYROIDISM DUE TO HASHIMOTO'S THYROIDITIS: ICD-10-CM

## 2023-05-12 RX ORDER — LEVOTHYROXINE SODIUM 88 MCG
TABLET ORAL
Qty: 12 TABLET | Refills: 5 | Status: SHIPPED | OUTPATIENT
Start: 2023-05-12

## 2023-07-04 LAB
T4 FREE SERPL-MCNC: 1.23 NG/DL (ref 0.82–1.77)
TSH SERPL DL<=0.005 MIU/L-ACNC: 0.33 UIU/ML (ref 0.45–4.5)

## 2023-07-07 ENCOUNTER — TELEPHONE (OUTPATIENT)
Dept: OTHER | Facility: OTHER | Age: 58
End: 2023-07-07

## 2023-07-07 NOTE — TELEPHONE ENCOUNTER
Patient called in regarding a message to call back for results. Went over provider note regarding most recent thyroid testing. Patient verbalized understanding and at present time is having no symptoms.

## 2023-11-10 DIAGNOSIS — E06.3 HYPOTHYROIDISM DUE TO HASHIMOTO'S THYROIDITIS: ICD-10-CM

## 2023-11-10 DIAGNOSIS — E03.8 HYPOTHYROIDISM DUE TO HASHIMOTO'S THYROIDITIS: ICD-10-CM

## 2023-11-10 RX ORDER — LEVOTHYROXINE SODIUM 88 MCG
TABLET ORAL
Qty: 12 TABLET | Refills: 5 | Status: SHIPPED | OUTPATIENT
Start: 2023-11-10

## 2024-01-04 LAB
ALBUMIN SERPL-MCNC: 4.8 G/DL (ref 3.8–4.9)
ALBUMIN/CREAT UR: 5 MG/G CREAT (ref 0–29)
ALBUMIN/GLOB SERPL: 2.2 {RATIO} (ref 1.2–2.2)
ALP SERPL-CCNC: 74 IU/L (ref 44–121)
ALT SERPL-CCNC: 24 IU/L (ref 0–32)
AST SERPL-CCNC: 21 IU/L (ref 0–40)
BASOPHILS # BLD AUTO: 0.1 X10E3/UL (ref 0–0.2)
BASOPHILS NFR BLD AUTO: 1 %
BILIRUB SERPL-MCNC: 0.6 MG/DL (ref 0–1.2)
BUN SERPL-MCNC: 16 MG/DL (ref 6–24)
BUN/CREAT SERPL: 20 (ref 9–23)
CALCIUM SERPL-MCNC: 9.8 MG/DL (ref 8.7–10.2)
CHLORIDE SERPL-SCNC: 106 MMOL/L (ref 96–106)
CO2 SERPL-SCNC: 24 MMOL/L (ref 20–29)
CREAT SERPL-MCNC: 0.81 MG/DL (ref 0.57–1)
CREAT UR-MCNC: 140.6 MG/DL
EGFR: 85 ML/MIN/1.73
EOSINOPHIL # BLD AUTO: 0.6 X10E3/UL (ref 0–0.4)
EOSINOPHIL NFR BLD AUTO: 6 %
ERYTHROCYTE [DISTWIDTH] IN BLOOD BY AUTOMATED COUNT: 13 % (ref 11.7–15.4)
EST. AVERAGE GLUCOSE BLD GHB EST-MCNC: 171 MG/DL
GLOBULIN SER-MCNC: 2.2 G/DL (ref 1.5–4.5)
GLUCOSE SERPL-MCNC: 142 MG/DL (ref 70–99)
HBA1C MFR BLD: 7.6 % (ref 4.8–5.6)
HCT VFR BLD AUTO: 42.6 % (ref 34–46.6)
HGB BLD-MCNC: 14 G/DL (ref 11.1–15.9)
IMM GRANULOCYTES # BLD: 0.1 X10E3/UL (ref 0–0.1)
IMM GRANULOCYTES NFR BLD: 1 %
LYMPHOCYTES # BLD AUTO: 3.9 X10E3/UL (ref 0.7–3.1)
LYMPHOCYTES NFR BLD AUTO: 39 %
MCH RBC QN AUTO: 29.9 PG (ref 26.6–33)
MCHC RBC AUTO-ENTMCNC: 32.9 G/DL (ref 31.5–35.7)
MCV RBC AUTO: 91 FL (ref 79–97)
MICROALBUMIN UR-MCNC: 7 UG/ML
MONOCYTES # BLD AUTO: 0.5 X10E3/UL (ref 0.1–0.9)
MONOCYTES NFR BLD AUTO: 5 %
NEUTROPHILS # BLD AUTO: 4.8 X10E3/UL (ref 1.4–7)
NEUTROPHILS NFR BLD AUTO: 48 %
PLATELET # BLD AUTO: 234 X10E3/UL (ref 150–450)
POTASSIUM SERPL-SCNC: 4.4 MMOL/L (ref 3.5–5.2)
PROT SERPL-MCNC: 7 G/DL (ref 6–8.5)
RBC # BLD AUTO: 4.69 X10E6/UL (ref 3.77–5.28)
SODIUM SERPL-SCNC: 147 MMOL/L (ref 134–144)
T4 FREE SERPL-MCNC: 1.23 NG/DL (ref 0.82–1.77)
TSH SERPL DL<=0.005 MIU/L-ACNC: 2.5 UIU/ML (ref 0.45–4.5)
WBC # BLD AUTO: 10 X10E3/UL (ref 3.4–10.8)

## 2024-01-09 ENCOUNTER — OFFICE VISIT (OUTPATIENT)
Dept: ENDOCRINOLOGY | Facility: HOSPITAL | Age: 59
End: 2024-01-09
Payer: COMMERCIAL

## 2024-01-09 VITALS
SYSTOLIC BLOOD PRESSURE: 120 MMHG | WEIGHT: 205 LBS | HEIGHT: 65 IN | DIASTOLIC BLOOD PRESSURE: 80 MMHG | BODY MASS INDEX: 34.16 KG/M2 | HEART RATE: 85 BPM

## 2024-01-09 DIAGNOSIS — E06.3 HYPOTHYROIDISM DUE TO HASHIMOTO'S THYROIDITIS: Primary | ICD-10-CM

## 2024-01-09 DIAGNOSIS — E03.8 HYPOTHYROIDISM DUE TO HASHIMOTO'S THYROIDITIS: Primary | ICD-10-CM

## 2024-01-09 PROCEDURE — 99214 OFFICE O/P EST MOD 30 MIN: CPT | Performed by: INTERNAL MEDICINE

## 2024-01-09 NOTE — PROGRESS NOTES
1/10/2024    Assessment/Plan     1. Hypothyroidism due to Hashimoto's thyroiditis  -     T4, free Lab Collect; Future; Expected date: 12/02/2024  -     TSH, 3rd generation Lab Collect; Future; Expected date: 12/02/2024  -     T4, free Lab Collect  -     TSH, 3rd generation Lab Collect           1. Hypothyroidism due to Hashimoto's thyroiditis.  Most recent thyroid function studies are normal. She will continue the same levothyroxine and Synthroid dosage as she is biochemically and clinically euthyroid.    2. Type 2 diabetes.  She has type 2 diabetes and is treated with metformin. She does have her primary care physician treating this. She is going to clarify to be sure that is the case as if her primarycare physician (PCP) wants me to treat her diabetes, then I would need to see her more frequently.    At the moment, I have asked her to follow up in 1 year with preceding TSH and free T4.    CC: Hypothyroidism follow-up    HPI: Mariola Haque is a 58-year-old female with history of hypothyroidism due to Hashimoto's thyroiditis for a follow-up visit.     She was started on thyroid hormone about 7 to 8 years ago.     She has been diagnosed with type 2 diabetes and is taking metformin  mg twice a day, treated by her primary care physician. Her blood glucose has improved, so her metformin has been discontinued.    The patient verifies ongoing use of Metformin XR 2 tablets daily for diabetes management. She indicates that she typically omits the evening dose of Metformin XR in instances of diarrhea. She has a scheduled appointment with Dr Lion later this month, during which she intends to confirm that she is primarily responsible for her diabetes management.    She is on levothyroxine 100 mcg 4 days a week and Synthroid 88 mcg 3 days a week. She exhibits symptoms of heat intolerance yet experiences cold extremities. She also reports intermittent episodes of feeling warm. Despite denying palpitations, she occasionally  experiences them due to her anxiety.     She has been consulting with a pulmonologist due to sleep apnea and is currently on CPAP therapy. Despite this, she continues to experience significant fatigue upon waking. A BiPAP test has been suggested, necessitating an overnight study. She verbalizes that she needs to find a doctor willing to send her to a place for a specific test affiliated by her insurance. She uses an inhaler for approximately 1 to 2 years. She suffers from multiple uncontrolled allergies, which have worsened this year, leading to increased nasal allergies and 3 severe allergic reactions. She reports occasional bronchial pain during respiration but notes relief from bronchospasms with inhaler use. She intermittently expectorates copious amounts of mucus. Her blood glucose levels rise during allergic reactions and steroid therapy.    She denies dysphagia. She reports she usually has constipation, but she can get occasional diarrhea immediately. She needs to monitor her diet due to food allergies. Periodic tremors are also noted. She has xeroderma, brittle nails, and hair loss. Her anxiety levels remain relatively stable, despite an increase in negative events over the past 6 months. Her 's mood swings and outbursts contribute to her anxiety and depression, leading to stress eating. A slight weight gain has been observed. She is unable to engage in physical activity due to chronic pelvic and spinal pain.      Review of Systems  The pertinent positive and negative findings are as noted in the HPI.    Historical Information   Past Medical History:   Diagnosis Date    Anxiety     Depression     Dizzy spells     Fibromyalgia     Fracture of right lower leg 01/2018    GERD (gastroesophageal reflux disease)     Hyperlipidemia     Myofascial pain dysfunction syndrome     Osteoporosis     Seasonal and perennial allergic rhinitis      Past Surgical History:   Procedure Laterality Date    ANKLE SURGERY  Right     KNEE ARTHROTOMY Left     X 3    SINUS SURGERY      X 4    TOTAL KNEE ARTHROPLASTY Left 12/2019    WISDOM TOOTH EXTRACTION       Social History   Social History     Substance and Sexual Activity   Alcohol Use No     Social History     Substance and Sexual Activity   Drug Use No     Social History     Tobacco Use   Smoking Status Never   Smokeless Tobacco Never     Family History:   Family History   Problem Relation Age of Onset    Breast cancer Mother     Thyroid disease unspecified Mother         post partial thyroidectomy    Diabetes unspecified Father     Hypertension Father     Heart attack Father     Heart disease Father     Breast cancer Maternal Grandmother     Diabetes unspecified Maternal Grandfather     Diabetes unspecified Paternal Grandfather     Heart Valve Disease Brother         Has mitral valve disease and had endocarditis       Meds/Allergies   Current Outpatient Medications   Medication Sig Dispense Refill    budesonide (PULMICORT) 0.5 mg/2 mL nebulizer solution TAKE 1 VIAL (2 ML) USING NEBULIZER EVERY 12 HOURS.  3    Calcium Carb-Cholecalciferol (CALCIUM-VITAMIN D3) 250-125 MG-UNIT TABS Take 1 tablet by mouth      cetirizine (ZyrTEC) 10 MG chewable tablet Chew daily      cholecalciferol (VITAMIN D3) 1,000 units tablet Take 4,000 Units by mouth daily      levothyroxine 100 mcg tablet TAKE 1 TABLET BY MOUTH 4 DAYS A WEEK 12 tablet 19    montelukast (SINGULAIR) 10 mg tablet Take 10 mg by mouth      Multiple Vitamins-Minerals (MULTIVITAMIN ADULT EXTRA C PO) Take 1 capsule by mouth daily      pregabalin (LYRICA) 100 mg capsule Take 100 mg by mouth Three times a day      pseudoephedrine (SUDAFED) 120 MG 12 hr tablet Take 120 mg by mouth as needed        sertraline (ZOLOFT) 100 mg tablet Take 100 mg by mouth daily  4    sertraline (ZOLOFT) 50 mg tablet TAKE 1 TABLET DAILY WITH 100MG TABLET  5    simvastatin (ZOCOR) 40 mg tablet Take 40 mg by mouth daily  3    Synthroid 88 MCG tablet TAKE 1  "TABLET BY MOUTH THREE TIMES A WEEK. 12 tablet 5    Triamcinolone Acetonide 55 MCG/ACT AERO into each nostril      metFORMIN (GLUCOPHAGE-XR) 500 mg 24 hr tablet Take 500 mg by mouth 2 (two) times a day       No current facility-administered medications for this visit.     Allergies   Allergen Reactions    Blueberry Flavor - Food Allergy Hives    Clindamycin Hives    Doxycycline Hives    Dust Mite Extract Sneezing    Milk Protein - Food Allergy Hives    Orange Oil - Food Allergy Hives    Shellfish Allergy - Food Allergy Hives    Tomato - Food Allergy Hives    Trichophyton Sneezing    Vancomycin      Other reaction(s): Throat Swelling     Wheat Bran - Food Allergy Hives    Dicloxacillin Rash    Sulfa Antibiotics Rash       Objective   Vitals: Blood pressure 120/80, pulse 85, height 5' 5\" (1.651 m), weight 93 kg (205 lb).  Invasive Devices       None                   Physical Exam  Physical exam normal with pertinent positives and negatives.  Eyes: No lid lags, stare, proptosis, or periorbital edema.  Neck: Thyroid normal in size. No palpable nodules.  Respiratory: Lungs are clear.  Cardiovascular: Heart regular. No murmurs.  Neurological: No tremor of the outstretched hands. Patellar deep tendon reflexes normal.    The history was obtained from the review of the chart and from the patient.      Lab Results:      Blood work performed on 01/03/2024 showed a TSH of 2.5 with a free T4 of 1.23.     Of note, hemoglobin A1c is 7.6%.     Urine microalbumin to creatinine ratio is normal at 3.     CBC is normal.     CMP showed a glucose of 142 but was otherwise normal.    Lab Results   Component Value Date    CREATININE 0.81 01/03/2024    CREATININE 0.88 01/04/2023    CREATININE 0.90 01/03/2022    BUN 16 01/03/2024    K 4.4 01/03/2024     01/03/2024    CO2 24 01/03/2024     eGFR   Date Value Ref Range Status   01/03/2024 85 >59 mL/min/1.73 Final     Lab Results   Component Value Date    ALT 24 01/03/2024    AST 21 " 01/03/2024       Lab Results   Component Value Date    TSH 2.500 01/03/2024    FREET4 1.23 01/03/2024         Blood work performed on 01/03/2024 showed a TSH of 2.5 with a free T4 of 1.23.     Of note, hemoglobin A1c is 7.6%.     Urine microalbumin to creatinine ratio is normal at 3.     CBC is normal.     CMP showed a glucose of 142 but was otherwise normal.    Future Appointments   Date Time Provider Department Center   1/13/2025  4:00 PM Snow Milner MD [facility]       Transcribed for Snow Milner MD, by Alen Berman on 01/10/24 at 4:53 PM. Powered by Dragon [employer].

## 2024-01-09 NOTE — PATIENT INSTRUCTIONS
The thyroid blood work is normal.     Continue the same synthroid and levothyroxine doses.     Clarify with the PCP who is treating the diabetes and let me know. If it is me, we would see you about every 6 months with blood work.     For now, follow up in 1 year with blood work.

## 2024-04-12 DIAGNOSIS — E03.9 HYPOTHYROIDISM, UNSPECIFIED TYPE: ICD-10-CM

## 2024-04-12 RX ORDER — LEVOTHYROXINE SODIUM 0.1 MG/1
TABLET ORAL
Qty: 12 TABLET | Refills: 1 | Status: SHIPPED | OUTPATIENT
Start: 2024-04-12

## 2024-05-09 DIAGNOSIS — E03.8 HYPOTHYROIDISM DUE TO HASHIMOTO'S THYROIDITIS: ICD-10-CM

## 2024-05-09 DIAGNOSIS — E06.3 HYPOTHYROIDISM DUE TO HASHIMOTO'S THYROIDITIS: ICD-10-CM

## 2024-05-09 RX ORDER — LEVOTHYROXINE SODIUM 88 MCG
TABLET ORAL
Qty: 12 TABLET | Refills: 5 | Status: SHIPPED | OUTPATIENT
Start: 2024-05-09

## 2024-06-08 DIAGNOSIS — E03.9 HYPOTHYROIDISM, UNSPECIFIED TYPE: ICD-10-CM

## 2024-06-08 RX ORDER — LEVOTHYROXINE SODIUM 0.1 MG/1
TABLET ORAL
Qty: 16 TABLET | Refills: 5 | Status: SHIPPED | OUTPATIENT
Start: 2024-06-08

## 2024-10-31 DIAGNOSIS — E06.3 HYPOTHYROIDISM DUE TO HASHIMOTO'S THYROIDITIS: ICD-10-CM

## 2024-10-31 RX ORDER — LEVOTHYROXINE SODIUM 88 MCG
TABLET ORAL
Qty: 12 TABLET | Refills: 2 | Status: SHIPPED | OUTPATIENT
Start: 2024-10-31

## 2024-11-28 DIAGNOSIS — E03.9 HYPOTHYROIDISM, UNSPECIFIED TYPE: ICD-10-CM

## 2024-11-29 RX ORDER — LEVOTHYROXINE SODIUM 100 UG/1
TABLET ORAL
Qty: 16 TABLET | Refills: 5 | Status: SHIPPED | OUTPATIENT
Start: 2024-11-29

## 2025-01-29 DIAGNOSIS — E06.3 HYPOTHYROIDISM DUE TO HASHIMOTO'S THYROIDITIS: ICD-10-CM

## 2025-01-30 RX ORDER — LEVOTHYROXINE SODIUM 88 MCG
88 TABLET ORAL 3 TIMES WEEKLY
Qty: 12 TABLET | Refills: 3 | Status: SHIPPED | OUTPATIENT
Start: 2025-01-31

## 2025-05-01 DIAGNOSIS — E06.3 HYPOTHYROIDISM DUE TO HASHIMOTO'S THYROIDITIS: Primary | ICD-10-CM

## 2025-05-07 LAB
T4 FREE SERPL-MCNC: 1.43 NG/DL (ref 0.82–1.77)
TSH SERPL DL<=0.005 MIU/L-ACNC: 0.84 UIU/ML (ref 0.45–4.5)

## 2025-05-09 ENCOUNTER — RESULTS FOLLOW-UP (OUTPATIENT)
Dept: ENDOCRINOLOGY | Facility: HOSPITAL | Age: 60
End: 2025-05-09

## 2025-05-24 DIAGNOSIS — E03.9 HYPOTHYROIDISM, UNSPECIFIED TYPE: ICD-10-CM

## 2025-05-24 DIAGNOSIS — E06.3 HYPOTHYROIDISM DUE TO HASHIMOTO'S THYROIDITIS: ICD-10-CM

## 2025-05-27 RX ORDER — LEVOTHYROXINE SODIUM 88 MCG
88 TABLET ORAL 3 TIMES WEEKLY
Qty: 12 TABLET | Refills: 1 | Status: SHIPPED | OUTPATIENT
Start: 2025-05-28

## 2025-05-27 RX ORDER — LEVOTHYROXINE SODIUM 100 UG/1
TABLET ORAL
Qty: 16 TABLET | Refills: 1 | Status: SHIPPED | OUTPATIENT
Start: 2025-05-27

## 2025-05-27 NOTE — TELEPHONE ENCOUNTER
Requested medication(s) are due for refill today: Yes  Patient has already received a courtesy refill: No  Other reason request has been forwarded to provider: failed guidelines

## 2025-06-25 ENCOUNTER — OFFICE VISIT (OUTPATIENT)
Dept: ENDOCRINOLOGY | Facility: HOSPITAL | Age: 60
End: 2025-06-25
Payer: COMMERCIAL

## 2025-06-25 VITALS
HEIGHT: 65 IN | OXYGEN SATURATION: 95 % | HEART RATE: 81 BPM | DIASTOLIC BLOOD PRESSURE: 70 MMHG | BODY MASS INDEX: 33.82 KG/M2 | SYSTOLIC BLOOD PRESSURE: 114 MMHG | WEIGHT: 203 LBS

## 2025-06-25 DIAGNOSIS — E11.65 TYPE 2 DIABETES MELLITUS WITH HYPERGLYCEMIA, WITHOUT LONG-TERM CURRENT USE OF INSULIN (HCC): ICD-10-CM

## 2025-06-25 DIAGNOSIS — E06.3 HYPOTHYROIDISM DUE TO HASHIMOTO'S THYROIDITIS: Primary | ICD-10-CM

## 2025-06-25 PROCEDURE — 99214 OFFICE O/P EST MOD 30 MIN: CPT | Performed by: INTERNAL MEDICINE

## 2025-06-25 RX ORDER — DAPAGLIFLOZIN 10 MG/1
1 TABLET, FILM COATED ORAL DAILY
COMMUNITY
Start: 2025-05-27

## 2025-06-25 RX ORDER — ALBUTEROL SULFATE 90 UG/1
INHALANT RESPIRATORY (INHALATION)
COMMUNITY
Start: 2025-05-12

## 2025-06-25 RX ORDER — FLUTICASONE FUROATE AND VILANTEROL TRIFENATATE 100; 25 UG/1; UG/1
1 POWDER RESPIRATORY (INHALATION) DAILY
COMMUNITY
Start: 2025-05-10

## 2025-06-25 RX ORDER — AZELASTINE HCL 205.5 UG/1
SPRAY NASAL
COMMUNITY
Start: 2021-02-08

## 2025-06-25 RX ORDER — AZITHROMYCIN 250 MG/1
TABLET, FILM COATED ORAL
COMMUNITY
Start: 2025-02-10

## 2025-06-25 RX ORDER — LANCETS
EACH MISCELLANEOUS
COMMUNITY
Start: 2025-05-25

## 2025-06-25 RX ORDER — ROSUVASTATIN CALCIUM 20 MG/1
TABLET, COATED ORAL
COMMUNITY
Start: 2025-05-13

## 2025-06-25 NOTE — PATIENT INSTRUCTIONS
The thyroid blodo work is normal.     Continue the same synthroid and levothyroxine.    Follow up In 1 year with blood work.

## 2025-06-25 NOTE — PROGRESS NOTES
Name: Mariola Haque      : 1965      MRN: [id number]  Encounter Provider: Snow Milner MD  Encounter Date: 2025   Encounter department: [facility]    No chief complaint on file.  :  Assessment & Plan  Hypothyroidism due to Hashimoto's thyroiditis    Orders:    T4, free; Future    TSH, 3rd generation; Future    Type 2 diabetes mellitus with hyperglycemia, without long-term current use of insulin (HCC)    Lab Results   Component Value Date    HGBA1C 7.6 (H) 2024              Assessment & Plan  1. Hypothyroidism due to Hashimoto's thyroiditis.  - History of hypothyroidism due to Hashimoto's thyroiditis.  - Thyroid function tests from last month were within normal limits.  - Reports feeling tired all the time, experiencing dry skin, hair, and nails, and occasional hair loss.  - Continue current medication regimen of levothyroxine 100 mcg four days a week and Synthroid 88 mcg three days a week.    2. Type 2 diabetes mellitus.  - Last A1c was 7, an improvement from previous levels.  - Reports difficulty in losing weight due to chronic pain and food allergies, complicating her diet.  - Advised to monitor carbohydrate intake and include protein with every meal to prevent blood sugar spikes.  - Continue current medications of metformin XR and Farxiga, and dietary adjustments.    I have asked her to follow-up in 1 year with preceding TSH and free T4.      Pertinent Medical History   Mariola Haque  is a 60-year-old female with a history of hypothyroidism due to Hashimoto's thyroiditis.    She was started on thyroid hormone for replacement purposes about 8 to 9 years ago. She has been diagnosed with type 2 diabetes and takes metformin XR per her primary care physician.        History of Present Illness   History of Present Illness  Mariola Haque  is a 60-year-old female with a history of hypothyroidism due to Hashimoto's thyroiditis, here for a follow-up visit.      She was started on thyroid hormone for replacement purposes about 8 to 9 years ago. She has been diagnosed with type 2 diabetes and takes metformin XR per her primary care physician.    She is currently on a regimen of levothyroxine 100 mcg four days a week and Synthroid 88 mcg three days a week. She reports no significant changes in her thyroid condition, although she experiences occasional hot flashes and persistent fatigue. She also reports dry skin, hair, and nails, along with hair loss. She occasionally experiences tremors but does not report any heart palpitations. However, she does experience brief episodes of heart pounding upon waking, which resolve spontaneously.     She acknowledges the need for weight loss but finds it challenging due to chronic pain that limits her physical activity. She consumes smaller portions and keeps almonds and pistachios on hand to manage hunger. She also drinks large amounts of water and occasionally eats an apple when feeling hungry. Her typical breakfast includes raspberries or blackberries with oatmeal, which she finds beneficial for digestion. She uses sunflower seed butter as an alternative to peanut butter. She is intolerant to peanuts.    She was previously on Ozempic, which was increased from 0.5 mg to 1 mg to aid in weight loss, but she experienced severe side effects and was unable to tolerate the medication. Her last A1c level was 7, down from 7.2, and it was 6.7 while on Ozempic. She is currently on Farxiga in addition to metformin XR.    She has a history of sleep apnea and uses a CPAP machine at night, which she dislikes.    She has a history of anxiety and depression and is currently on Zoloft 150 mg.      Review of Systems as per HPI  Medical History Reviewed by provider this encounter:  Tobacco  Allergies  Meds  Problems  Med Hx  Surg Hx  Fam Hx     .  Medications Ordered Prior to Encounter[1]   Social History[2]     Medical History Reviewed by  "provider this encounter:  Tobacco  Allergies  Meds  Problems  Med Hx  Surg Hx  Fam Hx     .    Objective   /70   Pulse 81   Ht 5' 5\" (1.651 m)   Wt 92.1 kg (203 lb)   SpO2 95%   BMI 33.78 kg/m²      Body mass index is 33.78 kg/m².  Wt Readings from Last 3 Encounters:   06/25/25 92.1 kg (203 lb)   01/09/24 93 kg (205 lb)   01/11/23 93.2 kg (205 lb 6.4 oz)     Physical Exam  Physical Exam  Head and Neck: No lid lag, strabismus, or periorbital edema. Thyroid normal in size with no palpable nodules.  Cardiovascular: Heart has a regular rate and rhythm. No murmurs.  Respiratory: Lungs clear to auscultation.  Musculoskeletal: No tremor of the outstretched hands. Patellar deep tendon reflexes are normal.    Results    Labs: I have reviewed pertinent labs including:     Blood work performed on 5/6/2025 showed a TSH of 0.84 with a free T4 of 1.43.    Lab Results   Component Value Date    FREET4 1.43 05/06/2025      Radiology Results Review : No pertinent imaging studies reviewed.  Patient Instructions   The thyroid blodo work is normal.     Continue the same synthroid and levothyroxine.    Follow up In 1 year with blood work.     Discussed with the patient and all questioned fully answered. She will call me if any problems arise.           [1]   Current Outpatient Medications on File Prior to Visit   Medication Sig Dispense Refill    albuterol (PROVENTIL HFA,VENTOLIN HFA) 90 mcg/act inhaler 180 MCG INHALED EVERY 4-6 HOURS AS NEEDED FOR WHEEZING/SHORTNESS OF BREATH 180 MCG = 2 PUFFS      Azelastine HCl 0.15 % SOLN       azithromycin (ZITHROMAX) 250 mg tablet       Breo Ellipta 100-25 MCG/ACT inhaler Inhale 1 puff daily      budesonide (PULMICORT) 0.5 mg/2 mL nebulizer solution   3    Calcium Carb-Cholecalciferol (CALCIUM-VITAMIN D3) 250-125 MG-UNIT TABS Take 1 tablet by mouth      cetirizine (ZyrTEC) 10 MG chewable tablet Chew in the morning.      cholecalciferol (VITAMIN D3) 1,000 units tablet Take 4,000 " Units by mouth in the morning.      Contour Next Test test strip use to test once daily      EPINEPHrine 0.3 MG/0.3ML SOSY       Farxiga 10 MG tablet Take 1 tablet by mouth in the morning      levothyroxine 100 mcg tablet TAKE 1 TABLET BY MOUTH 4 DAYS A WEEK 16 tablet 1    metFORMIN (GLUCOPHAGE-XR) 500 mg 24 hr tablet Take 500 mg by mouth in the morning and 500 mg in the evening.      Microlet Lancets MISC Use as directed      montelukast (SINGULAIR) 10 mg tablet Take 10 mg by mouth      Multiple Vitamins-Minerals (MULTIVITAMIN ADULT EXTRA C PO) Take 1 capsule by mouth in the morning.      pregabalin (LYRICA) 100 mg capsule Take 100 mg by mouth in the morning and 100 mg at noon and 100 mg in the evening.      pseudoephedrine (SUDAFED) 120 MG 12 hr tablet Take 120 mg by mouth as needed      rosuvastatin (CRESTOR) 20 MG tablet       sertraline (ZOLOFT) 100 mg tablet Take 100 mg by mouth in the morning.  4    sertraline (ZOLOFT) 50 mg tablet   5    Synthroid 88 MCG tablet TAKE 1 TABLET BY MOUTH THREE TIMES A WEEK. 12 tablet 1    Triamcinolone Acetonide 55 MCG/ACT AERO into each nostril       No current facility-administered medications on file prior to visit.   [2]   Social History  Tobacco Use    Smoking status: Never    Smokeless tobacco: Never   Vaping Use    Vaping status: Never Used   Substance and Sexual Activity    Alcohol use: No    Drug use: No    Sexual activity: Not Currently     Partners: Male     Birth control/protection: None

## 2025-07-16 DIAGNOSIS — E03.9 HYPOTHYROIDISM, UNSPECIFIED TYPE: ICD-10-CM

## 2025-07-17 RX ORDER — LEVOTHYROXINE SODIUM 100 UG/1
TABLET ORAL
Qty: 48 TABLET | Refills: 1 | Status: SHIPPED | OUTPATIENT
Start: 2025-07-17

## 2025-07-20 DIAGNOSIS — E06.3 HYPOTHYROIDISM DUE TO HASHIMOTO'S THYROIDITIS: ICD-10-CM

## 2025-07-21 RX ORDER — LEVOTHYROXINE SODIUM 88 MCG
88 TABLET ORAL 3 TIMES WEEKLY
Qty: 12 TABLET | Refills: 5 | Status: SHIPPED | OUTPATIENT
Start: 2025-07-21

## 2025-08-13 PROBLEM — G47.33 OSA ON CPAP: Status: ACTIVE | Noted: 2025-08-13

## 2025-08-13 PROBLEM — M79.7 FIBROMYALGIA: Status: ACTIVE | Noted: 2025-08-13

## 2025-08-13 PROBLEM — E01.0 THYROMEGALY: Status: ACTIVE | Noted: 2025-08-13

## 2025-08-13 PROBLEM — R92.30 DENSE BREAST TISSUE ON MAMMOGRAM: Status: ACTIVE | Noted: 2025-08-13

## 2025-08-13 PROBLEM — D84.9: Status: ACTIVE | Noted: 2025-08-13

## 2025-08-13 PROBLEM — M79.18 MYOFASCIAL PAIN: Status: ACTIVE | Noted: 2025-08-13

## 2025-08-13 PROBLEM — E66.9 OBESITY: Status: ACTIVE | Noted: 2025-08-13

## 2025-08-13 PROBLEM — J45.909 UNSPECIFIED ASTHMA, UNCOMPLICATED: Status: ACTIVE | Noted: 2025-08-13

## 2025-08-13 PROBLEM — R53.83 FATIGUE: Status: ACTIVE | Noted: 2025-08-13

## 2025-08-13 PROBLEM — F32.A DEPRESSION: Status: ACTIVE | Noted: 2025-08-13

## 2025-08-13 PROBLEM — M19.90 OSTEOARTHRITIS: Status: ACTIVE | Noted: 2025-08-13

## 2025-08-13 PROBLEM — E11.65 DIABETES MELLITUS WITH HYPERGLYCEMIA (HCC): Status: ACTIVE | Noted: 2025-08-13

## 2025-08-13 PROBLEM — E55.9 VITAMIN D DEFICIENCY: Status: ACTIVE | Noted: 2025-08-13

## 2025-08-13 PROBLEM — E66.01 MORBID OBESITY (HCC): Status: ACTIVE | Noted: 2025-08-13

## 2025-08-13 PROBLEM — Z87.898 HISTORY OF GENETIC COUNSELING: Status: ACTIVE | Noted: 2025-08-13

## 2025-08-13 PROBLEM — R94.31 ABNORMAL QT INTERVAL PRESENT ON ELECTROCARDIOGRAM: Status: ACTIVE | Noted: 2025-08-13

## 2025-08-20 DIAGNOSIS — G89.29 OTHER CHRONIC PAIN: Primary | ICD-10-CM

## 2025-08-20 RX ORDER — PREGABALIN 100 MG/1
100 CAPSULE ORAL 3 TIMES DAILY
Qty: 90 CAPSULE | Refills: 5 | Status: SHIPPED | OUTPATIENT
Start: 2025-08-20

## 2025-08-20 RX ORDER — PREGABALIN 100 MG/1
100 CAPSULE ORAL 3 TIMES DAILY
Qty: 90 CAPSULE | Refills: 4 | Status: SHIPPED | OUTPATIENT
Start: 2025-08-20